# Patient Record
Sex: FEMALE | Race: BLACK OR AFRICAN AMERICAN | Employment: OTHER | URBAN - METROPOLITAN AREA
[De-identification: names, ages, dates, MRNs, and addresses within clinical notes are randomized per-mention and may not be internally consistent; named-entity substitution may affect disease eponyms.]

---

## 2017-01-01 ENCOUNTER — APPOINTMENT (OUTPATIENT)
Dept: GENERAL RADIOLOGY | Age: 82
DRG: 208 | End: 2017-01-01
Attending: INTERNAL MEDICINE
Payer: MEDICARE

## 2017-01-01 ENCOUNTER — APPOINTMENT (OUTPATIENT)
Dept: GENERAL RADIOLOGY | Age: 82
End: 2017-01-01
Attending: EMERGENCY MEDICINE
Payer: MEDICARE

## 2017-01-01 ENCOUNTER — APPOINTMENT (OUTPATIENT)
Dept: CT IMAGING | Age: 82
DRG: 208 | End: 2017-01-01
Attending: EMERGENCY MEDICINE
Payer: MEDICARE

## 2017-01-01 ENCOUNTER — HOSPITAL ENCOUNTER (INPATIENT)
Age: 82
LOS: 3 days | DRG: 208 | End: 2017-02-18
Attending: EMERGENCY MEDICINE | Admitting: HOSPITALIST
Payer: MEDICARE

## 2017-01-01 ENCOUNTER — HOSPITAL ENCOUNTER (EMERGENCY)
Age: 82
Discharge: HOME | End: 2017-02-03
Attending: EMERGENCY MEDICINE
Payer: MEDICARE

## 2017-01-01 ENCOUNTER — APPOINTMENT (OUTPATIENT)
Dept: GENERAL RADIOLOGY | Age: 82
DRG: 208 | End: 2017-01-01
Attending: EMERGENCY MEDICINE
Payer: MEDICARE

## 2017-01-01 ENCOUNTER — HOSPITAL ENCOUNTER (EMERGENCY)
Age: 82
Discharge: HOME | DRG: 208 | End: 2017-02-15
Attending: EMERGENCY MEDICINE
Payer: MEDICARE

## 2017-01-01 ENCOUNTER — APPOINTMENT (OUTPATIENT)
Dept: GENERAL RADIOLOGY | Age: 82
DRG: 208 | End: 2017-01-01
Attending: HOSPITALIST
Payer: MEDICARE

## 2017-01-01 VITALS
RESPIRATION RATE: 16 BRPM | DIASTOLIC BLOOD PRESSURE: 57 MMHG | SYSTOLIC BLOOD PRESSURE: 142 MMHG | OXYGEN SATURATION: 97 % | BODY MASS INDEX: 39.53 KG/M2 | WEIGHT: 223.11 LBS | HEART RATE: 81 BPM | TEMPERATURE: 97.9 F | HEIGHT: 63 IN

## 2017-01-01 VITALS
SYSTOLIC BLOOD PRESSURE: 177 MMHG | RESPIRATION RATE: 19 BRPM | OXYGEN SATURATION: 96 % | DIASTOLIC BLOOD PRESSURE: 94 MMHG | BODY MASS INDEX: 37.62 KG/M2 | WEIGHT: 212.3 LBS | HEART RATE: 71 BPM | TEMPERATURE: 98 F | HEIGHT: 63 IN

## 2017-01-01 VITALS
WEIGHT: 220.9 LBS | SYSTOLIC BLOOD PRESSURE: 61 MMHG | OXYGEN SATURATION: 76 % | RESPIRATION RATE: 27 BRPM | HEART RATE: 92 BPM | DIASTOLIC BLOOD PRESSURE: 35 MMHG | TEMPERATURE: 99.7 F | BODY MASS INDEX: 39.13 KG/M2

## 2017-01-01 DIAGNOSIS — Z71.89 COUNSELING REGARDING GOALS OF CARE: ICD-10-CM

## 2017-01-01 DIAGNOSIS — J44.9 CHRONIC OBSTRUCTIVE PULMONARY DISEASE, UNSPECIFIED COPD TYPE (HCC): Primary | ICD-10-CM

## 2017-01-01 DIAGNOSIS — G47.33 OSA (OBSTRUCTIVE SLEEP APNEA): ICD-10-CM

## 2017-01-01 DIAGNOSIS — Z71.89 DO NOT RESUSCITATE DISCUSSION: ICD-10-CM

## 2017-01-01 DIAGNOSIS — J96.00 ACUTE RESPIRATORY FAILURE, UNSPECIFIED WHETHER WITH HYPOXIA OR HYPERCAPNIA (HCC): ICD-10-CM

## 2017-01-01 DIAGNOSIS — J44.1 ACUTE EXACERBATION OF CHRONIC OBSTRUCTIVE PULMONARY DISEASE (COPD) (HCC): Primary | ICD-10-CM

## 2017-01-01 DIAGNOSIS — T85.698A MALFUNCTION OF DEVICE, INITIAL ENCOUNTER: ICD-10-CM

## 2017-01-01 DIAGNOSIS — E11.65 CONTROLLED TYPE 2 DIABETES MELLITUS WITH HYPERGLYCEMIA, WITH LONG-TERM CURRENT USE OF INSULIN (HCC): ICD-10-CM

## 2017-01-01 DIAGNOSIS — G93.1 ANOXIC BRAIN INJURY (HCC): ICD-10-CM

## 2017-01-01 DIAGNOSIS — J96.22 ACUTE ON CHRONIC RESPIRATORY FAILURE WITH HYPERCAPNIA (HCC): ICD-10-CM

## 2017-01-01 DIAGNOSIS — M16.12 ARTHRITIS OF LEFT HIP: ICD-10-CM

## 2017-01-01 DIAGNOSIS — M25.552 PAIN OF LEFT HIP JOINT: ICD-10-CM

## 2017-01-01 DIAGNOSIS — I46.9 CARDIAC ARREST (HCC): Primary | ICD-10-CM

## 2017-01-01 DIAGNOSIS — Z99.81 OXYGEN DEPENDENT: ICD-10-CM

## 2017-01-01 DIAGNOSIS — Z79.4 CONTROLLED TYPE 2 DIABETES MELLITUS WITH HYPERGLYCEMIA, WITH LONG-TERM CURRENT USE OF INSULIN (HCC): ICD-10-CM

## 2017-01-01 DIAGNOSIS — R06.02 SOB (SHORTNESS OF BREATH): ICD-10-CM

## 2017-01-01 LAB
ALBUMIN SERPL BCP-MCNC: 2.3 G/DL (ref 3.5–5)
ALBUMIN SERPL BCP-MCNC: 2.4 G/DL (ref 3.5–5)
ALBUMIN SERPL BCP-MCNC: 2.5 G/DL (ref 3.5–5)
ALBUMIN SERPL BCP-MCNC: 2.9 G/DL (ref 3.5–5)
ALBUMIN SERPL BCP-MCNC: 3 G/DL (ref 3.5–5)
ALBUMIN SERPL BCP-MCNC: 3.5 G/DL (ref 3.5–5)
ALBUMIN/GLOB SERPL: 0.6 {RATIO} (ref 1.1–2.2)
ALBUMIN/GLOB SERPL: 0.7 {RATIO} (ref 1.1–2.2)
ALBUMIN/GLOB SERPL: 0.7 {RATIO} (ref 1.1–2.2)
ALBUMIN/GLOB SERPL: 0.8 {RATIO} (ref 1.1–2.2)
ALP SERPL-CCNC: 105 U/L (ref 45–117)
ALP SERPL-CCNC: 109 U/L (ref 45–117)
ALP SERPL-CCNC: 121 U/L (ref 45–117)
ALP SERPL-CCNC: 72 U/L (ref 45–117)
ALP SERPL-CCNC: 79 U/L (ref 45–117)
ALP SERPL-CCNC: 87 U/L (ref 45–117)
ALT SERPL-CCNC: 100 U/L (ref 12–78)
ALT SERPL-CCNC: 20 U/L (ref 12–78)
ALT SERPL-CCNC: 27 U/L (ref 12–78)
ALT SERPL-CCNC: 44 U/L (ref 12–78)
ALT SERPL-CCNC: 50 U/L (ref 12–78)
ALT SERPL-CCNC: 73 U/L (ref 12–78)
AMMONIA PLAS-SCNC: 29 UMOL/L
ANION GAP BLD CALC-SCNC: 11 MMOL/L (ref 5–15)
ANION GAP BLD CALC-SCNC: 17 MMOL/L (ref 5–15)
ANION GAP BLD CALC-SCNC: 9 MMOL/L (ref 5–15)
APTT PPP: 26.8 SEC (ref 22.1–32.5)
ARTERIAL PATENCY WRIST A: YES
AST SERPL W P-5'-P-CCNC: 134 U/L (ref 15–37)
AST SERPL W P-5'-P-CCNC: 144 U/L (ref 15–37)
AST SERPL W P-5'-P-CCNC: 21 U/L (ref 15–37)
AST SERPL W P-5'-P-CCNC: 23 U/L (ref 15–37)
AST SERPL W P-5'-P-CCNC: 69 U/L (ref 15–37)
AST SERPL W P-5'-P-CCNC: 80 U/L (ref 15–37)
ATRIAL RATE: 109 BPM
ATRIAL RATE: 70 BPM
ATRIAL RATE: 73 BPM
BACTERIA SPEC CULT: NORMAL
BASE DEFICIT BLDA-SCNC: 5.8 MMOL/L
BASOPHILS # BLD AUTO: 0 K/UL
BASOPHILS # BLD AUTO: 0 K/UL (ref 0–0.1)
BASOPHILS # BLD: 0 %
BASOPHILS # BLD: 0 % (ref 0–1)
BASOPHILS # BLD: 1 % (ref 0–1)
BDY SITE: ABNORMAL
BILIRUB SERPL-MCNC: 0.2 MG/DL (ref 0.2–1)
BILIRUB SERPL-MCNC: 0.2 MG/DL (ref 0.2–1)
BILIRUB SERPL-MCNC: 0.3 MG/DL (ref 0.2–1)
BILIRUB SERPL-MCNC: 0.6 MG/DL (ref 0.2–1)
BILIRUB SERPL-MCNC: 0.6 MG/DL (ref 0.2–1)
BILIRUB SERPL-MCNC: 0.7 MG/DL (ref 0.2–1)
BNP SERPL-MCNC: 357 PG/ML (ref 0–450)
BUN SERPL-MCNC: 16 MG/DL (ref 6–20)
BUN SERPL-MCNC: 19 MG/DL (ref 6–20)
BUN SERPL-MCNC: 20 MG/DL (ref 6–20)
BUN SERPL-MCNC: 20 MG/DL (ref 6–20)
BUN SERPL-MCNC: 25 MG/DL (ref 6–20)
BUN SERPL-MCNC: 26 MG/DL (ref 6–20)
BUN/CREAT SERPL: 18 (ref 12–20)
BUN/CREAT SERPL: 20 (ref 12–20)
BUN/CREAT SERPL: 22 (ref 12–20)
BUN/CREAT SERPL: 28 (ref 12–20)
BUN/CREAT SERPL: 30 (ref 12–20)
BUN/CREAT SERPL: 30 (ref 12–20)
CALCIUM SERPL-MCNC: 7.7 MG/DL (ref 8.5–10.1)
CALCIUM SERPL-MCNC: 7.8 MG/DL (ref 8.5–10.1)
CALCIUM SERPL-MCNC: 7.8 MG/DL (ref 8.5–10.1)
CALCIUM SERPL-MCNC: 7.9 MG/DL (ref 8.5–10.1)
CALCIUM SERPL-MCNC: 8.4 MG/DL (ref 8.5–10.1)
CALCIUM SERPL-MCNC: 8.9 MG/DL (ref 8.5–10.1)
CALCULATED P AXIS, ECG09: 44 DEGREES
CALCULATED P AXIS, ECG09: 48 DEGREES
CALCULATED P AXIS, ECG09: 49 DEGREES
CALCULATED R AXIS, ECG10: -26 DEGREES
CALCULATED R AXIS, ECG10: -31 DEGREES
CALCULATED R AXIS, ECG10: -51 DEGREES
CALCULATED T AXIS, ECG11: 24 DEGREES
CALCULATED T AXIS, ECG11: 37 DEGREES
CALCULATED T AXIS, ECG11: 6 DEGREES
CHLORIDE SERPL-SCNC: 100 MMOL/L (ref 97–108)
CHLORIDE SERPL-SCNC: 104 MMOL/L (ref 97–108)
CHLORIDE SERPL-SCNC: 107 MMOL/L (ref 97–108)
CHLORIDE SERPL-SCNC: 109 MMOL/L (ref 97–108)
CHLORIDE SERPL-SCNC: 110 MMOL/L (ref 97–108)
CHLORIDE SERPL-SCNC: 99 MMOL/L (ref 97–108)
CK MB CFR SERPL CALC: 1.2 % (ref 0–2.5)
CK MB CFR SERPL CALC: 1.7 % (ref 0–2.5)
CK MB CFR SERPL CALC: 1.8 % (ref 0–2.5)
CK MB CFR SERPL CALC: NORMAL % (ref 0–2.5)
CK MB SERPL-MCNC: 2 NG/ML (ref 5–25)
CK MB SERPL-MCNC: 3.8 NG/ML (ref 5–25)
CK MB SERPL-MCNC: 4 NG/ML (ref 5–25)
CK MB SERPL-MCNC: <1 NG/ML (ref 5–25)
CK SERPL-CCNC: 114 U/L (ref 26–192)
CK SERPL-CCNC: 131 U/L (ref 26–192)
CK SERPL-CCNC: 172 U/L (ref 26–192)
CK SERPL-CCNC: 219 U/L (ref 26–192)
CK SERPL-CCNC: 219 U/L (ref 26–192)
CO2 SERPL-SCNC: 22 MMOL/L (ref 21–32)
CO2 SERPL-SCNC: 25 MMOL/L (ref 21–32)
CO2 SERPL-SCNC: 26 MMOL/L (ref 21–32)
CO2 SERPL-SCNC: 29 MMOL/L (ref 21–32)
CO2 SERPL-SCNC: 32 MMOL/L (ref 21–32)
CO2 SERPL-SCNC: 33 MMOL/L (ref 21–32)
CREAT SERPL-MCNC: 0.63 MG/DL (ref 0.55–1.02)
CREAT SERPL-MCNC: 0.81 MG/DL (ref 0.55–1.02)
CREAT SERPL-MCNC: 0.88 MG/DL (ref 0.55–1.02)
CREAT SERPL-MCNC: 0.88 MG/DL (ref 0.55–1.02)
CREAT SERPL-MCNC: 0.9 MG/DL (ref 0.55–1.02)
CREAT SERPL-MCNC: 1.12 MG/DL (ref 0.55–1.02)
DIAGNOSIS, 93000: NORMAL
DIFFERENTIAL METHOD BLD: ABNORMAL
EOSINOPHIL # BLD: 0 K/UL
EOSINOPHIL # BLD: 0 K/UL (ref 0–0.4)
EOSINOPHIL # BLD: 0.1 K/UL (ref 0–0.4)
EOSINOPHIL # BLD: 0.2 K/UL (ref 0–0.4)
EOSINOPHIL NFR BLD: 0 %
EOSINOPHIL NFR BLD: 0 % (ref 0–7)
EOSINOPHIL NFR BLD: 1 % (ref 0–7)
EOSINOPHIL NFR BLD: 2 % (ref 0–7)
EPAP/CPAP/PEEP, PAPEEP: 5
ERYTHROCYTE [DISTWIDTH] IN BLOOD BY AUTOMATED COUNT: 16.2 % (ref 11.5–14.5)
ERYTHROCYTE [DISTWIDTH] IN BLOOD BY AUTOMATED COUNT: 16.3 % (ref 11.5–14.5)
ERYTHROCYTE [DISTWIDTH] IN BLOOD BY AUTOMATED COUNT: 16.5 % (ref 11.5–14.5)
ERYTHROCYTE [DISTWIDTH] IN BLOOD BY AUTOMATED COUNT: 16.7 % (ref 11.5–14.5)
FIO2 ON VENT: 100 %
GAS FLOW.O2 SETTING OXYMISER: 16 L/MIN
GLOBULIN SER CALC-MCNC: 3.4 G/DL (ref 2–4)
GLOBULIN SER CALC-MCNC: 3.4 G/DL (ref 2–4)
GLOBULIN SER CALC-MCNC: 4.1 G/DL (ref 2–4)
GLOBULIN SER CALC-MCNC: 4.5 G/DL (ref 2–4)
GLOBULIN SER CALC-MCNC: 4.5 G/DL (ref 2–4)
GLOBULIN SER CALC-MCNC: 4.7 G/DL (ref 2–4)
GLUCOSE BLD STRIP.AUTO-MCNC: 118 MG/DL (ref 65–100)
GLUCOSE BLD STRIP.AUTO-MCNC: 123 MG/DL (ref 65–100)
GLUCOSE BLD STRIP.AUTO-MCNC: 123 MG/DL (ref 65–100)
GLUCOSE BLD STRIP.AUTO-MCNC: 131 MG/DL (ref 65–100)
GLUCOSE BLD STRIP.AUTO-MCNC: 133 MG/DL (ref 65–100)
GLUCOSE BLD STRIP.AUTO-MCNC: 134 MG/DL (ref 65–100)
GLUCOSE BLD STRIP.AUTO-MCNC: 134 MG/DL (ref 65–100)
GLUCOSE BLD STRIP.AUTO-MCNC: 146 MG/DL (ref 65–100)
GLUCOSE BLD STRIP.AUTO-MCNC: 216 MG/DL (ref 65–100)
GLUCOSE BLD STRIP.AUTO-MCNC: 83 MG/DL (ref 65–100)
GLUCOSE BLD STRIP.AUTO-MCNC: 93 MG/DL (ref 65–100)
GLUCOSE BLD STRIP.AUTO-MCNC: 95 MG/DL (ref 65–100)
GLUCOSE SERPL-MCNC: 116 MG/DL (ref 65–100)
GLUCOSE SERPL-MCNC: 116 MG/DL (ref 65–100)
GLUCOSE SERPL-MCNC: 129 MG/DL (ref 65–100)
GLUCOSE SERPL-MCNC: 151 MG/DL (ref 65–100)
GLUCOSE SERPL-MCNC: 162 MG/DL (ref 65–100)
GLUCOSE SERPL-MCNC: 281 MG/DL (ref 65–100)
GRAM STN SPEC: NORMAL
HCO3 BLDA-SCNC: 23 MMOL/L (ref 22–26)
HCT VFR BLD AUTO: 28.4 % (ref 35–47)
HCT VFR BLD AUTO: 28.4 % (ref 35–47)
HCT VFR BLD AUTO: 32.9 % (ref 35–47)
HCT VFR BLD AUTO: 34.6 % (ref 35–47)
HCT VFR BLD AUTO: 35.1 % (ref 35–47)
HCT VFR BLD AUTO: 36 % (ref 35–47)
HGB BLD-MCNC: 10.5 G/DL (ref 11.5–16)
HGB BLD-MCNC: 10.7 G/DL (ref 11.5–16)
HGB BLD-MCNC: 11.1 G/DL (ref 11.5–16)
HGB BLD-MCNC: 11.2 G/DL (ref 11.5–16)
HGB BLD-MCNC: 9.2 G/DL (ref 11.5–16)
HGB BLD-MCNC: 9.3 G/DL (ref 11.5–16)
INR PPP: 1.2 (ref 0.9–1.1)
LACTATE SERPL-SCNC: 8.2 MMOL/L (ref 0.4–2)
LYMPHOCYTES # BLD AUTO: 10 % (ref 12–49)
LYMPHOCYTES # BLD AUTO: 14 % (ref 12–49)
LYMPHOCYTES # BLD AUTO: 19 %
LYMPHOCYTES # BLD AUTO: 27 % (ref 12–49)
LYMPHOCYTES # BLD AUTO: 7 % (ref 12–49)
LYMPHOCYTES # BLD AUTO: 9 % (ref 12–49)
LYMPHOCYTES # BLD: 1.2 K/UL (ref 0.8–3.5)
LYMPHOCYTES # BLD: 1.3 K/UL (ref 0.8–3.5)
LYMPHOCYTES # BLD: 1.3 K/UL (ref 0.8–3.5)
LYMPHOCYTES # BLD: 1.8 K/UL (ref 0.8–3.5)
LYMPHOCYTES # BLD: 2.3 K/UL (ref 0.8–3.5)
LYMPHOCYTES # BLD: 2.9 K/UL
MAGNESIUM SERPL-MCNC: 1.7 MG/DL (ref 1.6–2.4)
MAGNESIUM SERPL-MCNC: 1.9 MG/DL (ref 1.6–2.4)
MAGNESIUM SERPL-MCNC: 2 MG/DL (ref 1.6–2.4)
MAGNESIUM SERPL-MCNC: 2.1 MG/DL (ref 1.6–2.4)
MCH RBC QN AUTO: 26.8 PG (ref 26–34)
MCH RBC QN AUTO: 26.9 PG (ref 26–34)
MCH RBC QN AUTO: 26.9 PG (ref 26–34)
MCH RBC QN AUTO: 27.2 PG (ref 26–34)
MCH RBC QN AUTO: 27.3 PG (ref 26–34)
MCH RBC QN AUTO: 27.4 PG (ref 26–34)
MCHC RBC AUTO-ENTMCNC: 30.8 G/DL (ref 30–36.5)
MCHC RBC AUTO-ENTMCNC: 30.9 G/DL (ref 30–36.5)
MCHC RBC AUTO-ENTMCNC: 31.9 G/DL (ref 30–36.5)
MCHC RBC AUTO-ENTMCNC: 31.9 G/DL (ref 30–36.5)
MCHC RBC AUTO-ENTMCNC: 32.4 G/DL (ref 30–36.5)
MCHC RBC AUTO-ENTMCNC: 32.7 G/DL (ref 30–36.5)
MCV RBC AUTO: 83 FL (ref 80–99)
MCV RBC AUTO: 83.5 FL (ref 80–99)
MCV RBC AUTO: 84.4 FL (ref 80–99)
MCV RBC AUTO: 85.6 FL (ref 80–99)
MCV RBC AUTO: 86.7 FL (ref 80–99)
MCV RBC AUTO: 88.2 FL (ref 80–99)
METAMYELOCYTES NFR BLD MANUAL: 4 %
MONOCYTES # BLD: 0 K/UL
MONOCYTES # BLD: 0.8 K/UL (ref 0–1)
MONOCYTES # BLD: 0.8 K/UL (ref 0–1)
MONOCYTES # BLD: 1.1 K/UL (ref 0–1)
MONOCYTES # BLD: 1.2 K/UL (ref 0–1)
MONOCYTES # BLD: 1.5 K/UL (ref 0–1)
MONOCYTES NFR BLD AUTO: 0 %
MONOCYTES NFR BLD AUTO: 10 % (ref 5–13)
MONOCYTES NFR BLD AUTO: 6 % (ref 5–13)
MONOCYTES NFR BLD AUTO: 7 % (ref 5–13)
MONOCYTES NFR BLD AUTO: 9 % (ref 5–13)
MONOCYTES NFR BLD AUTO: 9 % (ref 5–13)
NEUTS BAND NFR BLD MANUAL: 2 %
NEUTS SEG # BLD: 10.5 K/UL (ref 1.8–8)
NEUTS SEG # BLD: 11.1 K/UL (ref 1.8–8)
NEUTS SEG # BLD: 11.7 K/UL
NEUTS SEG # BLD: 12.4 K/UL (ref 1.8–8)
NEUTS SEG # BLD: 14.3 K/UL (ref 1.8–8)
NEUTS SEG # BLD: 5.1 K/UL (ref 1.8–8)
NEUTS SEG NFR BLD AUTO: 61 % (ref 32–75)
NEUTS SEG NFR BLD AUTO: 75 %
NEUTS SEG NFR BLD AUTO: 77 % (ref 32–75)
NEUTS SEG NFR BLD AUTO: 81 % (ref 32–75)
NEUTS SEG NFR BLD AUTO: 84 % (ref 32–75)
NEUTS SEG NFR BLD AUTO: 85 % (ref 32–75)
P-R INTERVAL, ECG05: 160 MS
P-R INTERVAL, ECG05: 166 MS
P-R INTERVAL, ECG05: 192 MS
PCO2 BLDA: 59 MMHG (ref 35–45)
PH BLDA: 7.21 [PH] (ref 7.35–7.45)
PHOSPHATE SERPL-MCNC: 2.2 MG/DL (ref 2.6–4.7)
PHOSPHATE SERPL-MCNC: 2.4 MG/DL (ref 2.6–4.7)
PHOSPHATE SERPL-MCNC: 4.2 MG/DL (ref 2.6–4.7)
PLATELET # BLD AUTO: 189 K/UL (ref 150–400)
PLATELET # BLD AUTO: 208 K/UL (ref 150–400)
PLATELET # BLD AUTO: 211 K/UL (ref 150–400)
PLATELET # BLD AUTO: 250 K/UL (ref 150–400)
PLATELET # BLD AUTO: 257 K/UL (ref 150–400)
PLATELET # BLD AUTO: 262 K/UL (ref 150–400)
PO2 BLDA: 235 MMHG (ref 80–100)
POTASSIUM SERPL-SCNC: 2.7 MMOL/L (ref 3.5–5.1)
POTASSIUM SERPL-SCNC: 3.1 MMOL/L (ref 3.5–5.1)
POTASSIUM SERPL-SCNC: 3.3 MMOL/L (ref 3.5–5.1)
POTASSIUM SERPL-SCNC: 3.7 MMOL/L (ref 3.5–5.1)
POTASSIUM SERPL-SCNC: 3.9 MMOL/L (ref 3.5–5.1)
POTASSIUM SERPL-SCNC: 4.4 MMOL/L (ref 3.5–5.1)
PROT SERPL-MCNC: 5.7 G/DL (ref 6.4–8.2)
PROT SERPL-MCNC: 5.9 G/DL (ref 6.4–8.2)
PROT SERPL-MCNC: 6.5 G/DL (ref 6.4–8.2)
PROT SERPL-MCNC: 7.4 G/DL (ref 6.4–8.2)
PROT SERPL-MCNC: 7.7 G/DL (ref 6.4–8.2)
PROT SERPL-MCNC: 8 G/DL (ref 6.4–8.2)
PROTHROMBIN TIME: 12 SEC (ref 9–11.1)
Q-T INTERVAL, ECG07: 340 MS
Q-T INTERVAL, ECG07: 398 MS
Q-T INTERVAL, ECG07: 412 MS
QRS DURATION, ECG06: 100 MS
QRS DURATION, ECG06: 106 MS
QRS DURATION, ECG06: 96 MS
QTC CALCULATION (BEZET), ECG08: 438 MS
QTC CALCULATION (BEZET), ECG08: 444 MS
QTC CALCULATION (BEZET), ECG08: 457 MS
RBC # BLD AUTO: 3.4 M/UL (ref 3.8–5.2)
RBC # BLD AUTO: 3.42 M/UL (ref 3.8–5.2)
RBC # BLD AUTO: 3.9 M/UL (ref 3.8–5.2)
RBC # BLD AUTO: 3.99 M/UL (ref 3.8–5.2)
RBC # BLD AUTO: 4.08 M/UL (ref 3.8–5.2)
RBC # BLD AUTO: 4.1 M/UL (ref 3.8–5.2)
RBC MORPH BLD: ABNORMAL
SAO2 % BLD: 99 % (ref 92–97)
SAO2% DEVICE SAO2% SENSOR NAME: ABNORMAL
SERVICE CMNT-IMP: ABNORMAL
SERVICE CMNT-IMP: NORMAL
SODIUM SERPL-SCNC: 141 MMOL/L (ref 136–145)
SODIUM SERPL-SCNC: 141 MMOL/L (ref 136–145)
SODIUM SERPL-SCNC: 143 MMOL/L (ref 136–145)
SODIUM SERPL-SCNC: 144 MMOL/L (ref 136–145)
SODIUM SERPL-SCNC: 145 MMOL/L (ref 136–145)
SODIUM SERPL-SCNC: 146 MMOL/L (ref 136–145)
SPECIMEN SITE: ABNORMAL
THERAPEUTIC RANGE,PTTT: NORMAL SECS (ref 58–77)
TRIGL SERPL-MCNC: 144 MG/DL (ref ?–150)
TROPONIN I SERPL-MCNC: 0.04 NG/ML
TROPONIN I SERPL-MCNC: 0.11 NG/ML
TROPONIN I SERPL-MCNC: 0.4 NG/ML
TROPONIN I SERPL-MCNC: <0.04 NG/ML
TROPONIN I SERPL-MCNC: <0.04 NG/ML
VENTILATION MODE VENT: ABNORMAL
VENTRICULAR RATE, ECG03: 109 BPM
VENTRICULAR RATE, ECG03: 70 BPM
VENTRICULAR RATE, ECG03: 73 BPM
VT SETTING VENT: 500 ML
WBC # BLD AUTO: 13.1 K/UL (ref 3.6–11)
WBC # BLD AUTO: 13.5 K/UL (ref 3.6–11)
WBC # BLD AUTO: 15.2 K/UL (ref 3.6–11)
WBC # BLD AUTO: 15.2 K/UL (ref 3.6–11)
WBC # BLD AUTO: 16.8 K/UL (ref 3.6–11)
WBC # BLD AUTO: 8.4 K/UL (ref 3.6–11)

## 2017-01-01 PROCEDURE — 74011000258 HC RX REV CODE- 258: Performed by: PSYCHIATRY & NEUROLOGY

## 2017-01-01 PROCEDURE — 82553 CREATINE MB FRACTION: CPT | Performed by: INTERNAL MEDICINE

## 2017-01-01 PROCEDURE — 74011000250 HC RX REV CODE- 250: Performed by: INTERNAL MEDICINE

## 2017-01-01 PROCEDURE — 85610 PROTHROMBIN TIME: CPT | Performed by: HOSPITALIST

## 2017-01-01 PROCEDURE — 74011000258 HC RX REV CODE- 258: Performed by: INTERNAL MEDICINE

## 2017-01-01 PROCEDURE — 85025 COMPLETE CBC W/AUTO DIFF WBC: CPT | Performed by: EMERGENCY MEDICINE

## 2017-01-01 PROCEDURE — 82140 ASSAY OF AMMONIA: CPT | Performed by: INTERNAL MEDICINE

## 2017-01-01 PROCEDURE — 83735 ASSAY OF MAGNESIUM: CPT | Performed by: INTERNAL MEDICINE

## 2017-01-01 PROCEDURE — 83735 ASSAY OF MAGNESIUM: CPT | Performed by: HOSPITALIST

## 2017-01-01 PROCEDURE — 74011250636 HC RX REV CODE- 250/636: Performed by: EMERGENCY MEDICINE

## 2017-01-01 PROCEDURE — 85025 COMPLETE CBC W/AUTO DIFF WBC: CPT | Performed by: INTERNAL MEDICINE

## 2017-01-01 PROCEDURE — 84484 ASSAY OF TROPONIN QUANT: CPT | Performed by: INTERNAL MEDICINE

## 2017-01-01 PROCEDURE — 36569 INSJ PICC 5 YR+ W/O IMAGING: CPT | Performed by: INTERNAL MEDICINE

## 2017-01-01 PROCEDURE — 82962 GLUCOSE BLOOD TEST: CPT

## 2017-01-01 PROCEDURE — 87070 CULTURE OTHR SPECIMN AEROBIC: CPT | Performed by: HOSPITALIST

## 2017-01-01 PROCEDURE — 94640 AIRWAY INHALATION TREATMENT: CPT

## 2017-01-01 PROCEDURE — 74011250637 HC RX REV CODE- 250/637: Performed by: HOSPITALIST

## 2017-01-01 PROCEDURE — 74011250636 HC RX REV CODE- 250/636: Performed by: INTERNAL MEDICINE

## 2017-01-01 PROCEDURE — 87040 BLOOD CULTURE FOR BACTERIA: CPT | Performed by: EMERGENCY MEDICINE

## 2017-01-01 PROCEDURE — 74011250636 HC RX REV CODE- 250/636: Performed by: PSYCHIATRY & NEUROLOGY

## 2017-01-01 PROCEDURE — 80053 COMPREHEN METABOLIC PANEL: CPT | Performed by: HOSPITALIST

## 2017-01-01 PROCEDURE — 74011000250 HC RX REV CODE- 250: Performed by: HOSPITALIST

## 2017-01-01 PROCEDURE — 02HV33Z INSERTION OF INFUSION DEVICE INTO SUPERIOR VENA CAVA, PERCUTANEOUS APPROACH: ICD-10-PCS | Performed by: INTERNAL MEDICINE

## 2017-01-01 PROCEDURE — 85730 THROMBOPLASTIN TIME PARTIAL: CPT | Performed by: HOSPITALIST

## 2017-01-01 PROCEDURE — 95816 EEG AWAKE AND DROWSY: CPT | Performed by: PSYCHIATRY & NEUROLOGY

## 2017-01-01 PROCEDURE — 77030018786 HC NDL GD F/USND BARD -B

## 2017-01-01 PROCEDURE — 0BH17EZ INSERTION OF ENDOTRACHEAL AIRWAY INTO TRACHEA, VIA NATURAL OR ARTIFICIAL OPENING: ICD-10-PCS | Performed by: EMERGENCY MEDICINE

## 2017-01-01 PROCEDURE — C9113 INJ PANTOPRAZOLE SODIUM, VIA: HCPCS | Performed by: HOSPITALIST

## 2017-01-01 PROCEDURE — 73502 X-RAY EXAM HIP UNI 2-3 VIEWS: CPT

## 2017-01-01 PROCEDURE — 82550 ASSAY OF CK (CPK): CPT | Performed by: INTERNAL MEDICINE

## 2017-01-01 PROCEDURE — 93005 ELECTROCARDIOGRAM TRACING: CPT

## 2017-01-01 PROCEDURE — 84484 ASSAY OF TROPONIN QUANT: CPT | Performed by: EMERGENCY MEDICINE

## 2017-01-01 PROCEDURE — 94002 VENT MGMT INPAT INIT DAY: CPT

## 2017-01-01 PROCEDURE — 36415 COLL VENOUS BLD VENIPUNCTURE: CPT | Performed by: INTERNAL MEDICINE

## 2017-01-01 PROCEDURE — 80053 COMPREHEN METABOLIC PANEL: CPT | Performed by: INTERNAL MEDICINE

## 2017-01-01 PROCEDURE — 77030029684 HC NEB SM VOL KT MONA -A

## 2017-01-01 PROCEDURE — 65620000000 HC RM CCU GENERAL

## 2017-01-01 PROCEDURE — 84100 ASSAY OF PHOSPHORUS: CPT | Performed by: HOSPITALIST

## 2017-01-01 PROCEDURE — 84100 ASSAY OF PHOSPHORUS: CPT | Performed by: INTERNAL MEDICINE

## 2017-01-01 PROCEDURE — 36415 COLL VENOUS BLD VENIPUNCTURE: CPT | Performed by: EMERGENCY MEDICINE

## 2017-01-01 PROCEDURE — 70450 CT HEAD/BRAIN W/O DYE: CPT

## 2017-01-01 PROCEDURE — 76937 US GUIDE VASCULAR ACCESS: CPT

## 2017-01-01 PROCEDURE — 36600 WITHDRAWAL OF ARTERIAL BLOOD: CPT | Performed by: EMERGENCY MEDICINE

## 2017-01-01 PROCEDURE — 84484 ASSAY OF TROPONIN QUANT: CPT | Performed by: HOSPITALIST

## 2017-01-01 PROCEDURE — 82803 BLOOD GASES ANY COMBINATION: CPT | Performed by: EMERGENCY MEDICINE

## 2017-01-01 PROCEDURE — 77030032490 HC SLV COMPR SCD KNE COVD -B

## 2017-01-01 PROCEDURE — 80053 COMPREHEN METABOLIC PANEL: CPT | Performed by: EMERGENCY MEDICINE

## 2017-01-01 PROCEDURE — 85025 COMPLETE CBC W/AUTO DIFF WBC: CPT | Performed by: HOSPITALIST

## 2017-01-01 PROCEDURE — 74011636637 HC RX REV CODE- 636/637: Performed by: HOSPITALIST

## 2017-01-01 PROCEDURE — 74011000250 HC RX REV CODE- 250: Performed by: EMERGENCY MEDICINE

## 2017-01-01 PROCEDURE — 77010033678 HC OXYGEN DAILY

## 2017-01-01 PROCEDURE — 71010 XR CHEST PORT: CPT

## 2017-01-01 PROCEDURE — 82550 ASSAY OF CK (CPK): CPT | Performed by: EMERGENCY MEDICINE

## 2017-01-01 PROCEDURE — C1751 CATH, INF, PER/CENT/MIDLINE: HCPCS

## 2017-01-01 PROCEDURE — 84478 ASSAY OF TRIGLYCERIDES: CPT | Performed by: INTERNAL MEDICINE

## 2017-01-01 PROCEDURE — 83880 ASSAY OF NATRIURETIC PEPTIDE: CPT | Performed by: EMERGENCY MEDICINE

## 2017-01-01 PROCEDURE — 99285 EMERGENCY DEPT VISIT HI MDM: CPT

## 2017-01-01 PROCEDURE — 5A1945Z RESPIRATORY VENTILATION, 24-96 CONSECUTIVE HOURS: ICD-10-PCS | Performed by: EMERGENCY MEDICINE

## 2017-01-01 PROCEDURE — 74011250636 HC RX REV CODE- 250/636: Performed by: HOSPITALIST

## 2017-01-01 PROCEDURE — 94003 VENT MGMT INPAT SUBQ DAY: CPT

## 2017-01-01 PROCEDURE — 82550 ASSAY OF CK (CPK): CPT | Performed by: HOSPITALIST

## 2017-01-01 PROCEDURE — 77030020847 HC STATLOK BARD -A

## 2017-01-01 PROCEDURE — 77030018798 HC PMP KT ENTRL FED COVD -A

## 2017-01-01 PROCEDURE — 51798 US URINE CAPACITY MEASURE: CPT

## 2017-01-01 PROCEDURE — 76450000000

## 2017-01-01 PROCEDURE — 36415 COLL VENOUS BLD VENIPUNCTURE: CPT | Performed by: HOSPITALIST

## 2017-01-01 RX ORDER — ENALAPRILAT 1.25 MG/ML
1.25 INJECTION INTRAVENOUS EVERY 6 HOURS
Status: DISCONTINUED | OUTPATIENT
Start: 2017-01-01 | End: 2017-01-01

## 2017-01-01 RX ORDER — SODIUM CHLORIDE 9 MG/ML
500 INJECTION, SOLUTION INTRAVENOUS ONCE
Status: COMPLETED | OUTPATIENT
Start: 2017-01-01 | End: 2017-01-01

## 2017-01-01 RX ORDER — LATANOPROST 50 UG/ML
1 SOLUTION/ DROPS OPHTHALMIC
Status: DISCONTINUED | OUTPATIENT
Start: 2017-01-01 | End: 2017-02-19 | Stop reason: HOSPADM

## 2017-01-01 RX ORDER — AZITHROMYCIN 250 MG/1
250 TABLET, FILM COATED ORAL SEE ADMIN INSTRUCTIONS
Status: ON HOLD | COMMUNITY
End: 2017-01-01 | Stop reason: ALTCHOICE

## 2017-01-01 RX ORDER — ONDANSETRON 2 MG/ML
4 INJECTION INTRAMUSCULAR; INTRAVENOUS
Status: DISCONTINUED | OUTPATIENT
Start: 2017-01-01 | End: 2017-02-19 | Stop reason: HOSPADM

## 2017-01-01 RX ORDER — IPRATROPIUM BROMIDE AND ALBUTEROL SULFATE 2.5; .5 MG/3ML; MG/3ML
3 SOLUTION RESPIRATORY (INHALATION) ONCE
Status: COMPLETED | OUTPATIENT
Start: 2017-01-01 | End: 2017-01-01

## 2017-01-01 RX ORDER — SODIUM CHLORIDE 0.9 % (FLUSH) 0.9 %
10-30 SYRINGE (ML) INJECTION AS NEEDED
Status: DISCONTINUED | OUTPATIENT
Start: 2017-01-01 | End: 2017-01-01

## 2017-01-01 RX ORDER — SODIUM CHLORIDE 9 MG/ML
50 INJECTION, SOLUTION INTRAVENOUS CONTINUOUS
Status: DISCONTINUED | OUTPATIENT
Start: 2017-01-01 | End: 2017-01-01

## 2017-01-01 RX ORDER — SODIUM CHLORIDE 0.9 % (FLUSH) 0.9 %
20 SYRINGE (ML) INJECTION EVERY 24 HOURS
Status: DISCONTINUED | OUTPATIENT
Start: 2017-01-01 | End: 2017-01-01

## 2017-01-01 RX ORDER — SODIUM CHLORIDE 0.9 % (FLUSH) 0.9 %
10-40 SYRINGE (ML) INJECTION EVERY 8 HOURS
Status: DISCONTINUED | OUTPATIENT
Start: 2017-01-01 | End: 2017-01-01

## 2017-01-01 RX ORDER — GUAIFENESIN 100 MG/5ML
324 LIQUID (ML) ORAL
Status: COMPLETED | OUTPATIENT
Start: 2017-01-01 | End: 2017-01-01

## 2017-01-01 RX ORDER — BUMETANIDE 1 MG/1
1 TABLET ORAL 2 TIMES DAILY
COMMUNITY

## 2017-01-01 RX ORDER — ALBUTEROL SULFATE 5 MG/ML
2.5 SOLUTION RESPIRATORY (INHALATION) ONCE
COMMUNITY

## 2017-01-01 RX ORDER — INSULIN LISPRO 100 [IU]/ML
INJECTION, SOLUTION INTRAVENOUS; SUBCUTANEOUS EVERY 6 HOURS
Status: DISCONTINUED | OUTPATIENT
Start: 2017-01-01 | End: 2017-01-01

## 2017-01-01 RX ORDER — IPRATROPIUM BROMIDE AND ALBUTEROL SULFATE 2.5; .5 MG/3ML; MG/3ML
3 SOLUTION RESPIRATORY (INHALATION)
Status: ON HOLD | COMMUNITY
End: 2017-01-01 | Stop reason: ALTCHOICE

## 2017-01-01 RX ORDER — PROPOFOL 10 MG/ML
INJECTION, EMULSION INTRAVENOUS
Status: DISPENSED
Start: 2017-01-01 | End: 2017-01-01

## 2017-01-01 RX ORDER — PROPOFOL 10 MG/ML
5-50 VIAL (ML) INTRAVENOUS
Status: DISCONTINUED | OUTPATIENT
Start: 2017-01-01 | End: 2017-01-01

## 2017-01-01 RX ORDER — MAGNESIUM SULFATE HEPTAHYDRATE 40 MG/ML
2 INJECTION, SOLUTION INTRAVENOUS ONCE
Status: COMPLETED | OUTPATIENT
Start: 2017-01-01 | End: 2017-01-01

## 2017-01-01 RX ORDER — SCOLOPAMINE TRANSDERMAL SYSTEM 1 MG/1
1.5 PATCH, EXTENDED RELEASE TRANSDERMAL
Status: DISCONTINUED | OUTPATIENT
Start: 2017-01-01 | End: 2017-02-19 | Stop reason: HOSPADM

## 2017-01-01 RX ORDER — EPINEPHRINE 0.1 MG/ML
INJECTION INTRACARDIAC; INTRAVENOUS
Status: COMPLETED | OUTPATIENT
Start: 2017-01-01 | End: 2017-01-01

## 2017-01-01 RX ORDER — CHLORHEXIDINE GLUCONATE 1.2 MG/ML
15 RINSE ORAL EVERY 12 HOURS
Status: DISCONTINUED | OUTPATIENT
Start: 2017-01-01 | End: 2017-01-01

## 2017-01-01 RX ORDER — MONTELUKAST SODIUM 10 MG/1
10 TABLET ORAL DAILY
COMMUNITY

## 2017-01-01 RX ORDER — LORAZEPAM 2 MG/ML
2 INJECTION INTRAMUSCULAR AS NEEDED
Status: DISCONTINUED | OUTPATIENT
Start: 2017-01-01 | End: 2017-02-19 | Stop reason: HOSPADM

## 2017-01-01 RX ORDER — PREDNISONE 20 MG/1
60 TABLET ORAL DAILY
Qty: 12 TAB | Refills: 0 | Status: ON HOLD | OUTPATIENT
Start: 2017-01-01 | End: 2017-01-01

## 2017-01-01 RX ORDER — GLYCOPYRROLATE 0.2 MG/ML
0.2 INJECTION INTRAMUSCULAR; INTRAVENOUS
Status: DISCONTINUED | OUTPATIENT
Start: 2017-01-01 | End: 2017-02-19 | Stop reason: HOSPADM

## 2017-01-01 RX ORDER — PREDNISONE 20 MG/1
60 TABLET ORAL
Status: COMPLETED | OUTPATIENT
Start: 2017-01-01 | End: 2017-01-01

## 2017-01-01 RX ORDER — POTASSIUM CHLORIDE 20 MEQ/1
20 TABLET, EXTENDED RELEASE ORAL DAILY
COMMUNITY

## 2017-01-01 RX ORDER — ACETAZOLAMIDE 250 MG/1
125 TABLET ORAL DAILY
Status: DISCONTINUED | OUTPATIENT
Start: 2017-01-01 | End: 2017-01-01

## 2017-01-01 RX ORDER — FLUTICASONE FUROATE AND VILANTEROL 100; 25 UG/1; UG/1
1 POWDER RESPIRATORY (INHALATION) DAILY
Status: DISCONTINUED | OUTPATIENT
Start: 2017-01-01 | End: 2017-01-01

## 2017-01-01 RX ORDER — TIZANIDINE 2 MG/1
2 TABLET ORAL AS NEEDED
COMMUNITY

## 2017-01-01 RX ORDER — SODIUM CHLORIDE 0.9 % (FLUSH) 0.9 %
10 SYRINGE (ML) INJECTION EVERY 24 HOURS
Status: DISCONTINUED | OUTPATIENT
Start: 2017-01-01 | End: 2017-01-01

## 2017-01-01 RX ORDER — LORAZEPAM 2 MG/ML
2 INJECTION INTRAMUSCULAR
Status: COMPLETED | OUTPATIENT
Start: 2017-01-01 | End: 2017-01-01

## 2017-01-01 RX ORDER — INSULIN LISPRO 100 [IU]/ML
INJECTION, SOLUTION INTRAVENOUS; SUBCUTANEOUS
Status: DISCONTINUED | OUTPATIENT
Start: 2017-01-01 | End: 2017-01-01

## 2017-01-01 RX ORDER — IPRATROPIUM BROMIDE AND ALBUTEROL SULFATE 2.5; .5 MG/3ML; MG/3ML
3 SOLUTION RESPIRATORY (INHALATION)
Status: DISPENSED | OUTPATIENT
Start: 2017-01-01 | End: 2017-01-01

## 2017-01-01 RX ORDER — CEFEPIME HYDROCHLORIDE 1 G/1
1 INJECTION, POWDER, FOR SOLUTION INTRAMUSCULAR; INTRAVENOUS EVERY 12 HOURS
Status: DISCONTINUED | OUTPATIENT
Start: 2017-01-01 | End: 2017-01-01 | Stop reason: DRUGHIGH

## 2017-01-01 RX ORDER — METOPROLOL TARTRATE 5 MG/5ML
5 INJECTION INTRAVENOUS
Status: DISCONTINUED | OUTPATIENT
Start: 2017-01-01 | End: 2017-01-01

## 2017-01-01 RX ORDER — POTASSIUM CHLORIDE 7.45 MG/ML
10 INJECTION INTRAVENOUS
Status: COMPLETED | OUTPATIENT
Start: 2017-01-01 | End: 2017-01-01

## 2017-01-01 RX ORDER — HEPARIN 100 UNIT/ML
300 SYRINGE INTRAVENOUS AS NEEDED
Status: DISCONTINUED | OUTPATIENT
Start: 2017-01-01 | End: 2017-01-01

## 2017-01-01 RX ORDER — TRAMADOL HYDROCHLORIDE 50 MG/1
50 TABLET ORAL
COMMUNITY

## 2017-01-01 RX ORDER — LANOLIN ALCOHOL/MO/W.PET/CERES
325 CREAM (GRAM) TOPICAL
COMMUNITY

## 2017-01-01 RX ORDER — MORPHINE SULFATE 4 MG/ML
4-8 INJECTION, SOLUTION INTRAMUSCULAR; INTRAVENOUS
Status: DISCONTINUED | OUTPATIENT
Start: 2017-01-01 | End: 2017-01-01

## 2017-01-01 RX ORDER — DEXTROSE 50 % IN WATER (D50W) INTRAVENOUS SYRINGE
12.5-25 AS NEEDED
Status: DISCONTINUED | OUTPATIENT
Start: 2017-01-01 | End: 2017-02-19 | Stop reason: HOSPADM

## 2017-01-01 RX ORDER — FENTANYL CITRATE 50 UG/ML
25-50 INJECTION, SOLUTION INTRAMUSCULAR; INTRAVENOUS
Status: DISCONTINUED | OUTPATIENT
Start: 2017-01-01 | End: 2017-01-01

## 2017-01-01 RX ORDER — LORAZEPAM 2 MG/ML
1 INJECTION INTRAMUSCULAR
Status: DISCONTINUED | OUTPATIENT
Start: 2017-01-01 | End: 2017-02-19 | Stop reason: HOSPADM

## 2017-01-01 RX ORDER — MAGNESIUM SULFATE 100 %
4 CRYSTALS MISCELLANEOUS AS NEEDED
Status: DISCONTINUED | OUTPATIENT
Start: 2017-01-01 | End: 2017-01-01

## 2017-01-01 RX ORDER — IPRATROPIUM BROMIDE AND ALBUTEROL SULFATE 2.5; .5 MG/3ML; MG/3ML
3 SOLUTION RESPIRATORY (INHALATION)
Status: DISCONTINUED | OUTPATIENT
Start: 2017-01-01 | End: 2017-01-01

## 2017-01-01 RX ORDER — SODIUM CHLORIDE 0.9 % (FLUSH) 0.9 %
10 SYRINGE (ML) INJECTION AS NEEDED
Status: DISCONTINUED | OUTPATIENT
Start: 2017-01-01 | End: 2017-01-01

## 2017-01-01 RX ORDER — DEXTROSE MONOHYDRATE AND SODIUM CHLORIDE 5; .9 G/100ML; G/100ML
50 INJECTION, SOLUTION INTRAVENOUS CONTINUOUS
Status: DISCONTINUED | OUTPATIENT
Start: 2017-01-01 | End: 2017-01-01

## 2017-01-01 RX ORDER — ACETAMINOPHEN 325 MG/1
650 TABLET ORAL
Status: DISCONTINUED | OUTPATIENT
Start: 2017-01-01 | End: 2017-02-19 | Stop reason: HOSPADM

## 2017-01-01 RX ORDER — PREDNISONE 10 MG/1
10 TABLET ORAL
Status: ON HOLD | COMMUNITY
End: 2017-01-01 | Stop reason: ALTCHOICE

## 2017-01-01 RX ORDER — SODIUM CHLORIDE 0.9 % (FLUSH) 0.9 %
5-10 SYRINGE (ML) INJECTION AS NEEDED
Status: DISCONTINUED | OUTPATIENT
Start: 2017-01-01 | End: 2017-02-19 | Stop reason: HOSPADM

## 2017-01-01 RX ORDER — LEVOFLOXACIN 5 MG/ML
750 INJECTION, SOLUTION INTRAVENOUS
Status: DISCONTINUED | OUTPATIENT
Start: 2017-01-01 | End: 2017-01-01

## 2017-01-01 RX ORDER — MUPIROCIN 20 MG/G
OINTMENT TOPICAL EVERY 12 HOURS
Status: DISCONTINUED | OUTPATIENT
Start: 2017-01-01 | End: 2017-02-19 | Stop reason: HOSPADM

## 2017-01-01 RX ORDER — ATROPINE SULFATE 0.1 MG/ML
INJECTION INTRAVENOUS
Status: COMPLETED | OUTPATIENT
Start: 2017-01-01 | End: 2017-01-01

## 2017-01-01 RX ORDER — SODIUM CHLORIDE 0.9 % (FLUSH) 0.9 %
5-10 SYRINGE (ML) INJECTION EVERY 8 HOURS
Status: DISCONTINUED | OUTPATIENT
Start: 2017-01-01 | End: 2017-02-19 | Stop reason: HOSPADM

## 2017-01-01 RX ORDER — DEXAMETHASONE SODIUM PHOSPHATE 4 MG/ML
4 INJECTION, SOLUTION INTRA-ARTICULAR; INTRALESIONAL; INTRAMUSCULAR; INTRAVENOUS; SOFT TISSUE
Status: DISCONTINUED | OUTPATIENT
Start: 2017-01-01 | End: 2017-02-19 | Stop reason: HOSPADM

## 2017-01-01 RX ORDER — MORPHINE SULFATE 4 MG/ML
4-8 INJECTION, SOLUTION INTRAMUSCULAR; INTRAVENOUS
Status: DISCONTINUED | OUTPATIENT
Start: 2017-01-01 | End: 2017-02-19 | Stop reason: HOSPADM

## 2017-01-01 RX ORDER — POTASSIUM CHLORIDE 29.8 MG/ML
20 INJECTION INTRAVENOUS
Status: DISCONTINUED | OUTPATIENT
Start: 2017-01-01 | End: 2017-01-01

## 2017-01-01 RX ORDER — LATANOPROST 50 UG/ML
1 SOLUTION/ DROPS OPHTHALMIC
COMMUNITY

## 2017-01-01 RX ORDER — SUCCINYLCHOLINE CHLORIDE 20 MG/ML
INJECTION INTRAMUSCULAR; INTRAVENOUS
Status: DISPENSED
Start: 2017-01-01 | End: 2017-01-01

## 2017-01-01 RX ORDER — LEVOFLOXACIN 5 MG/ML
500 INJECTION, SOLUTION INTRAVENOUS EVERY 24 HOURS
Status: DISCONTINUED | OUTPATIENT
Start: 2017-01-01 | End: 2017-01-01

## 2017-01-01 RX ADMIN — FENTANYL CITRATE 50 MCG: 50 INJECTION, SOLUTION INTRAMUSCULAR; INTRAVENOUS at 14:24

## 2017-01-01 RX ADMIN — CHLORHEXIDINE GLUCONATE 15 ML: 1.2 RINSE ORAL at 22:44

## 2017-01-01 RX ADMIN — LORAZEPAM 1 MG: 2 INJECTION INTRAMUSCULAR; INTRAVENOUS at 10:28

## 2017-01-01 RX ADMIN — EPINEPHRINE 1 MG: 0.1 INJECTION, SOLUTION ENDOTRACHEAL; INTRACARDIAC; INTRAVENOUS at 06:41

## 2017-01-01 RX ADMIN — PROPOFOL 25 MCG/KG/MIN: 10 INJECTION, EMULSION INTRAVENOUS at 02:21

## 2017-01-01 RX ADMIN — MUPIROCIN: 20 OINTMENT TOPICAL at 22:06

## 2017-01-01 RX ADMIN — IPRATROPIUM BROMIDE AND ALBUTEROL SULFATE 3 ML: .5; 3 SOLUTION RESPIRATORY (INHALATION) at 21:10

## 2017-01-01 RX ADMIN — LORAZEPAM 2 MG: 2 INJECTION INTRAMUSCULAR; INTRAVENOUS at 02:51

## 2017-01-01 RX ADMIN — PROPOFOL 25 MCG/KG/MIN: 10 INJECTION, EMULSION INTRAVENOUS at 12:56

## 2017-01-01 RX ADMIN — CHLORHEXIDINE GLUCONATE 15 ML: 1.2 RINSE ORAL at 08:06

## 2017-01-01 RX ADMIN — Medication 10 ML: at 05:16

## 2017-01-01 RX ADMIN — LORAZEPAM 2 MG: 2 INJECTION INTRAMUSCULAR; INTRAVENOUS at 04:06

## 2017-01-01 RX ADMIN — SODIUM CHLORIDE 500 ML: 900 INJECTION, SOLUTION INTRAVENOUS at 22:28

## 2017-01-01 RX ADMIN — DEXTROSE MONOHYDRATE AND SODIUM CHLORIDE 50 ML/HR: 5; .9 INJECTION, SOLUTION INTRAVENOUS at 17:23

## 2017-01-01 RX ADMIN — LEVOFLOXACIN 500 MG: 5 INJECTION, SOLUTION INTRAVENOUS at 16:14

## 2017-01-01 RX ADMIN — LORAZEPAM 1 MG: 2 INJECTION INTRAMUSCULAR; INTRAVENOUS at 00:02

## 2017-01-01 RX ADMIN — PROPOFOL 25 MCG/KG/MIN: 10 INJECTION, EMULSION INTRAVENOUS at 11:04

## 2017-01-01 RX ADMIN — PROPOFOL 25 MCG/KG/MIN: 10 INJECTION, EMULSION INTRAVENOUS at 21:59

## 2017-01-01 RX ADMIN — METOPROLOL TARTRATE 5 MG: 5 INJECTION INTRAVENOUS at 08:16

## 2017-01-01 RX ADMIN — Medication 10 ML: at 14:44

## 2017-01-01 RX ADMIN — LORAZEPAM 1 MG: 2 INJECTION INTRAMUSCULAR; INTRAVENOUS at 07:43

## 2017-01-01 RX ADMIN — IPRATROPIUM BROMIDE AND ALBUTEROL SULFATE 3 ML: .5; 3 SOLUTION RESPIRATORY (INHALATION) at 02:33

## 2017-01-01 RX ADMIN — PROPOFOL 25 MCG/KG/MIN: 10 INJECTION, EMULSION INTRAVENOUS at 08:45

## 2017-01-01 RX ADMIN — LEVETIRACETAM 1000 MG: 100 INJECTION, SOLUTION, CONCENTRATE INTRAVENOUS at 05:02

## 2017-01-01 RX ADMIN — ENALAPRILAT 1.25 MG: 2.5 INJECTION INTRAVENOUS at 12:27

## 2017-01-01 RX ADMIN — SODIUM CHLORIDE 40 MG: 9 INJECTION INTRAMUSCULAR; INTRAVENOUS; SUBCUTANEOUS at 08:15

## 2017-01-01 RX ADMIN — Medication 10 ML: at 05:15

## 2017-01-01 RX ADMIN — LORAZEPAM 1 MG: 2 INJECTION INTRAMUSCULAR; INTRAVENOUS at 11:41

## 2017-01-01 RX ADMIN — IPRATROPIUM BROMIDE AND ALBUTEROL SULFATE 3 ML: .5; 3 SOLUTION RESPIRATORY (INHALATION) at 07:48

## 2017-01-01 RX ADMIN — EPINEPHRINE 1 MG: 0.1 INJECTION, SOLUTION ENDOTRACHEAL; INTRACARDIAC; INTRAVENOUS at 06:43

## 2017-01-01 RX ADMIN — POTASSIUM CHLORIDE 10 MEQ: 10 INJECTION, SOLUTION INTRAVENOUS at 05:55

## 2017-01-01 RX ADMIN — Medication 10 ML: at 16:06

## 2017-01-01 RX ADMIN — IPRATROPIUM BROMIDE AND ALBUTEROL SULFATE 3 ML: .5; 3 SOLUTION RESPIRATORY (INHALATION) at 13:45

## 2017-01-01 RX ADMIN — MAGNESIUM SULFATE HEPTAHYDRATE 2 G: 40 INJECTION, SOLUTION INTRAVENOUS at 23:26

## 2017-01-01 RX ADMIN — DEXTROSE MONOHYDRATE AND SODIUM CHLORIDE 50 ML/HR: 5; .9 INJECTION, SOLUTION INTRAVENOUS at 16:03

## 2017-01-01 RX ADMIN — MUPIROCIN: 20 OINTMENT TOPICAL at 08:05

## 2017-01-01 RX ADMIN — LEVOFLOXACIN 750 MG: 5 INJECTION, SOLUTION INTRAVENOUS at 10:14

## 2017-01-01 RX ADMIN — LORAZEPAM 2 MG: 2 INJECTION INTRAMUSCULAR; INTRAVENOUS at 19:59

## 2017-01-01 RX ADMIN — SODIUM CHLORIDE 100 ML/HR: 900 INJECTION, SOLUTION INTRAVENOUS at 10:10

## 2017-01-01 RX ADMIN — POTASSIUM CHLORIDE 10 MEQ: 10 INJECTION, SOLUTION INTRAVENOUS at 05:09

## 2017-01-01 RX ADMIN — PROPOFOL 25 MCG/KG/MIN: 10 INJECTION, EMULSION INTRAVENOUS at 23:40

## 2017-01-01 RX ADMIN — ENALAPRILAT 1.25 MG: 2.5 INJECTION INTRAVENOUS at 23:39

## 2017-01-01 RX ADMIN — IPRATROPIUM BROMIDE AND ALBUTEROL SULFATE 3 ML: .5; 3 SOLUTION RESPIRATORY (INHALATION) at 20:21

## 2017-01-01 RX ADMIN — FENTANYL CITRATE 50 MCG: 50 INJECTION, SOLUTION INTRAMUSCULAR; INTRAVENOUS at 02:34

## 2017-01-01 RX ADMIN — Medication 10 ML: at 22:00

## 2017-01-01 RX ADMIN — MORPHINE SULFATE 8 MG: 4 INJECTION, SOLUTION INTRAMUSCULAR; INTRAVENOUS at 08:05

## 2017-01-01 RX ADMIN — PROPOFOL 25 MCG/KG/MIN: 10 INJECTION, EMULSION INTRAVENOUS at 16:03

## 2017-01-01 RX ADMIN — LEVETIRACETAM 1000 MG: 100 INJECTION, SOLUTION, CONCENTRATE INTRAVENOUS at 17:23

## 2017-01-01 RX ADMIN — LORAZEPAM 2 MG: 2 INJECTION INTRAMUSCULAR; INTRAVENOUS at 17:58

## 2017-01-01 RX ADMIN — LORAZEPAM 2 MG: 2 INJECTION INTRAMUSCULAR; INTRAVENOUS at 08:42

## 2017-01-01 RX ADMIN — LATANOPROST 1 DROP: 50 SOLUTION OPHTHALMIC at 22:07

## 2017-01-01 RX ADMIN — MORPHINE SULFATE 8 MG: 4 INJECTION, SOLUTION INTRAMUSCULAR; INTRAVENOUS at 09:27

## 2017-01-01 RX ADMIN — IPRATROPIUM BROMIDE AND ALBUTEROL SULFATE 3 ML: .5; 3 SOLUTION RESPIRATORY (INHALATION) at 13:20

## 2017-01-01 RX ADMIN — LORAZEPAM 2 MG: 2 INJECTION INTRAMUSCULAR; INTRAVENOUS at 21:58

## 2017-01-01 RX ADMIN — MUPIROCIN: 20 OINTMENT TOPICAL at 08:06

## 2017-01-01 RX ADMIN — METOPROLOL TARTRATE 5 MG: 5 INJECTION INTRAVENOUS at 16:21

## 2017-01-01 RX ADMIN — Medication 20 ML: at 12:10

## 2017-01-01 RX ADMIN — ENALAPRILAT 1.25 MG: 2.5 INJECTION INTRAVENOUS at 17:13

## 2017-01-01 RX ADMIN — PREDNISONE 60 MG: 20 TABLET ORAL at 23:25

## 2017-01-01 RX ADMIN — FENTANYL CITRATE 50 MCG: 50 INJECTION, SOLUTION INTRAMUSCULAR; INTRAVENOUS at 22:12

## 2017-01-01 RX ADMIN — IPRATROPIUM BROMIDE AND ALBUTEROL SULFATE 3 ML: .5; 3 SOLUTION RESPIRATORY (INHALATION) at 02:25

## 2017-01-01 RX ADMIN — LORAZEPAM 2 MG: 2 INJECTION INTRAMUSCULAR; INTRAVENOUS at 06:06

## 2017-01-01 RX ADMIN — MORPHINE SULFATE 8 MG: 4 INJECTION, SOLUTION INTRAMUSCULAR; INTRAVENOUS at 10:28

## 2017-01-01 RX ADMIN — LEVETIRACETAM 1000 MG: 100 INJECTION, SOLUTION, CONCENTRATE INTRAVENOUS at 05:15

## 2017-01-01 RX ADMIN — IPRATROPIUM BROMIDE AND ALBUTEROL SULFATE 3 ML: .5; 3 SOLUTION RESPIRATORY (INHALATION) at 22:26

## 2017-01-01 RX ADMIN — SODIUM CHLORIDE 40 MG: 9 INJECTION INTRAMUSCULAR; INTRAVENOUS; SUBCUTANEOUS at 10:16

## 2017-01-01 RX ADMIN — MORPHINE SULFATE 8 MG: 4 INJECTION, SOLUTION INTRAMUSCULAR; INTRAVENOUS at 11:41

## 2017-01-01 RX ADMIN — IPRATROPIUM BROMIDE AND ALBUTEROL SULFATE 3 ML: .5; 3 SOLUTION RESPIRATORY (INHALATION) at 03:59

## 2017-01-01 RX ADMIN — LEVETIRACETAM 1000 MG: 100 INJECTION, SOLUTION, CONCENTRATE INTRAVENOUS at 17:04

## 2017-01-01 RX ADMIN — FENTANYL CITRATE 50 MCG: 50 INJECTION, SOLUTION INTRAMUSCULAR; INTRAVENOUS at 07:43

## 2017-01-01 RX ADMIN — MUPIROCIN: 20 OINTMENT TOPICAL at 20:03

## 2017-01-01 RX ADMIN — Medication 10 ML: at 23:40

## 2017-01-01 RX ADMIN — IPRATROPIUM BROMIDE AND ALBUTEROL SULFATE 3 ML: .5; 3 SOLUTION RESPIRATORY (INHALATION) at 20:42

## 2017-01-01 RX ADMIN — IPRATROPIUM BROMIDE AND ALBUTEROL SULFATE 3 ML: .5; 3 SOLUTION RESPIRATORY (INHALATION) at 08:31

## 2017-01-01 RX ADMIN — CEFEPIME HYDROCHLORIDE 2 G: 2 INJECTION, POWDER, FOR SOLUTION INTRAVENOUS at 20:02

## 2017-01-01 RX ADMIN — POTASSIUM CHLORIDE 10 MEQ: 10 INJECTION, SOLUTION INTRAVENOUS at 06:50

## 2017-01-01 RX ADMIN — ENALAPRILAT 1.25 MG: 2.5 INJECTION INTRAVENOUS at 00:59

## 2017-01-01 RX ADMIN — ENALAPRILAT 1.25 MG: 2.5 INJECTION INTRAVENOUS at 06:00

## 2017-01-01 RX ADMIN — METOPROLOL TARTRATE 5 MG: 5 INJECTION INTRAVENOUS at 16:17

## 2017-01-01 RX ADMIN — ENALAPRILAT 1.25 MG: 2.5 INJECTION INTRAVENOUS at 11:00

## 2017-01-01 RX ADMIN — FENTANYL CITRATE 50 MCG: 50 INJECTION, SOLUTION INTRAMUSCULAR; INTRAVENOUS at 19:59

## 2017-01-01 RX ADMIN — IPRATROPIUM BROMIDE AND ALBUTEROL SULFATE 3 ML: .5; 3 SOLUTION RESPIRATORY (INHALATION) at 11:02

## 2017-01-01 RX ADMIN — Medication 10 ML: at 05:03

## 2017-01-01 RX ADMIN — EPINEPHRINE 1 MG: 0.1 INJECTION, SOLUTION ENDOTRACHEAL; INTRACARDIAC; INTRAVENOUS at 06:46

## 2017-01-01 RX ADMIN — Medication 10 ML: at 22:02

## 2017-01-01 RX ADMIN — LEVETIRACETAM 1000 MG: 100 INJECTION, SOLUTION, CONCENTRATE INTRAVENOUS at 05:08

## 2017-01-01 RX ADMIN — CHLORHEXIDINE GLUCONATE 15 ML: 1.2 RINSE ORAL at 20:03

## 2017-01-01 RX ADMIN — PROPOFOL 25 MCG/KG/MIN: 10 INJECTION, EMULSION INTRAVENOUS at 18:41

## 2017-01-01 RX ADMIN — ENALAPRILAT 1.25 MG: 2.5 INJECTION INTRAVENOUS at 05:15

## 2017-01-01 RX ADMIN — CEFEPIME HYDROCHLORIDE 2 G: 2 INJECTION, POWDER, FOR SOLUTION INTRAVENOUS at 08:16

## 2017-01-01 RX ADMIN — Medication 30 ML: at 16:09

## 2017-01-01 RX ADMIN — Medication 10 ML: at 16:07

## 2017-01-01 RX ADMIN — ATROPINE SULFATE 1 MG: 0.1 INJECTION PARENTERAL at 06:42

## 2017-01-01 RX ADMIN — GLYCOPYRROLATE 0.2 MG: 0.2 INJECTION, SOLUTION INTRAMUSCULAR; INTRAVENOUS at 12:15

## 2017-01-01 RX ADMIN — LORAZEPAM 2 MG: 2 INJECTION INTRAMUSCULAR; INTRAVENOUS at 15:04

## 2017-01-01 RX ADMIN — GLYCOPYRROLATE 0.2 MG: 0.2 INJECTION, SOLUTION INTRAMUSCULAR; INTRAVENOUS at 07:42

## 2017-01-01 RX ADMIN — MUPIROCIN: 20 OINTMENT TOPICAL at 22:44

## 2017-01-01 RX ADMIN — LORAZEPAM 1 MG: 2 INJECTION INTRAMUSCULAR; INTRAVENOUS at 21:15

## 2017-01-01 RX ADMIN — MORPHINE SULFATE 8 MG: 4 INJECTION, SOLUTION INTRAMUSCULAR; INTRAVENOUS at 14:44

## 2017-01-01 RX ADMIN — CHLORHEXIDINE GLUCONATE 15 ML: 1.2 RINSE ORAL at 22:06

## 2017-01-01 RX ADMIN — GLYCOPYRROLATE 0.2 MG: 0.2 INJECTION, SOLUTION INTRAMUSCULAR; INTRAVENOUS at 21:10

## 2017-01-01 RX ADMIN — IPRATROPIUM BROMIDE AND ALBUTEROL SULFATE 3 ML: .5; 3 SOLUTION RESPIRATORY (INHALATION) at 14:56

## 2017-01-01 RX ADMIN — MUPIROCIN: 20 OINTMENT TOPICAL at 08:20

## 2017-01-01 RX ADMIN — POTASSIUM CHLORIDE 10 MEQ: 10 INJECTION, SOLUTION INTRAVENOUS at 07:52

## 2017-01-01 RX ADMIN — ENALAPRILAT 1.25 MG: 2.5 INJECTION INTRAVENOUS at 17:21

## 2017-01-01 RX ADMIN — MORPHINE SULFATE 8 MG: 4 INJECTION, SOLUTION INTRAMUSCULAR; INTRAVENOUS at 13:04

## 2017-01-01 RX ADMIN — LEVETIRACETAM 1000 MG: 100 INJECTION, SOLUTION, CONCENTRATE INTRAVENOUS at 16:03

## 2017-01-01 RX ADMIN — ASPIRIN 81 MG 324 MG: 81 TABLET ORAL at 10:20

## 2017-01-01 RX ADMIN — METOPROLOL TARTRATE 5 MG: 5 INJECTION INTRAVENOUS at 19:02

## 2017-01-01 RX ADMIN — INSULIN LISPRO 2 UNITS: 100 INJECTION, SOLUTION INTRAVENOUS; SUBCUTANEOUS at 11:15

## 2017-01-01 RX ADMIN — LATANOPROST 1 DROP: 50 SOLUTION OPHTHALMIC at 22:47

## 2017-01-01 RX ADMIN — CHLORHEXIDINE GLUCONATE 15 ML: 1.2 RINSE ORAL at 09:25

## 2017-01-01 RX ADMIN — LATANOPROST 1 DROP: 50 SOLUTION OPHTHALMIC at 22:01

## 2017-01-01 RX ADMIN — IPRATROPIUM BROMIDE AND ALBUTEROL SULFATE 3 ML: .5; 3 SOLUTION RESPIRATORY (INHALATION) at 20:01

## 2017-01-01 RX ADMIN — CHLORHEXIDINE GLUCONATE 15 ML: 1.2 RINSE ORAL at 10:18

## 2017-01-01 RX ADMIN — Medication 10 ML: at 12:10

## 2017-01-01 RX ADMIN — Medication 10 ML: at 22:07

## 2017-01-01 RX ADMIN — SODIUM CHLORIDE 40 MG: 9 INJECTION INTRAMUSCULAR; INTRAVENOUS; SUBCUTANEOUS at 08:06

## 2017-01-01 RX ADMIN — POTASSIUM PHOSPHATE, MONOBASIC AND POTASSIUM PHOSPHATE, DIBASIC 20 MMOL: 224; 236 INJECTION, SOLUTION INTRAVENOUS at 10:28

## 2017-01-01 RX ADMIN — MUPIROCIN: 20 OINTMENT TOPICAL at 10:18

## 2017-02-03 NOTE — ED NOTES
Patient is here with increased SOB while wearing her CPAP mask. Pt notes this was her second night wearing the mask, and reports experiencing similar onset of SOB while wearing mask for the first time last night but SOB resolved last night after removing mask and placing NC back on.

## 2017-02-03 NOTE — ED NOTES
Patient waiting for family to come pick her up. This RN spoke with the patients daughter and she is working on arranging transportation for the patient.

## 2017-02-03 NOTE — ED NOTES
Patients son arrived to take patient home. Patient assisted into wheelchair. Assisted patient to sons car.  Patient discharged

## 2017-02-03 NOTE — DISCHARGE INSTRUCTIONS
Arthritis: Care Instructions  Your Care Instructions  Arthritis, also called osteoarthritis, is a breakdown of the cartilage that cushions your joints. When the cartilage wears down, your bones rub against each other. This causes pain and stiffness. Many people have some arthritis as they age. Arthritis most often affects the joints of the spine, hands, hips, knees, or feet. You can take simple measures to protect your joints, ease your pain, and help you stay active. Follow-up care is a key part of your treatment and safety. Be sure to make and go to all appointments, and call your doctor if you are having problems. It's also a good idea to know your test results and keep a list of the medicines you take. How can you care for yourself at home? · Stay at a healthy weight. Being overweight puts extra strain on your joints. · Talk to your doctor or physical therapist about exercises that will help ease joint pain. ¨ Stretch. You may enjoy gentle forms of yoga to help keep your joints and muscles flexible. ¨ Walk instead of jog. Other types of exercise that are less stressful on the joints include riding a bicycle, swimming, or water exercise. ¨ Lift weights. Strong muscles help reduce stress on your joints. Stronger thigh muscles, for example, take some of the stress off of the knees and hips. Learn the right way to lift weights so you do not make joint pain worse. · Take your medicines exactly as prescribed. Call your doctor if you think you are having a problem with your medicine. · Take pain medicines exactly as directed. ¨ If the doctor gave you a prescription medicine for pain, take it as prescribed. ¨ If you are not taking a prescription pain medicine, ask your doctor if you can take an over-the-counter medicine. · Use a cane, crutch, walker, or another device if you need help to get around. These can help rest your joints.  You also can use other things to make life easier, such as a higher toilet seat and padded handles on kitchen utensils. · Do not sit in low chairs, which can make it hard to get up. · Put heat or cold on your sore joints as needed. Use whichever helps you most. You also can take turns with hot and cold packs. ¨ Apply heat 2 or 3 times a day for 20 to 30 minutes--using a heating pad, hot shower, or hot pack--to relieve pain and stiffness. ¨ Put ice or a cold pack on your sore joint for 10 to 20 minutes at a time. Put a thin cloth between the ice and your skin. When should you call for help? Call your doctor now or seek immediate medical care if:  · You have sudden swelling, warmth, or pain in any joint. · You have joint pain and a fever or rash. · You have such bad pain that you cannot use a joint. Watch closely for changes in your health, and be sure to contact your doctor if:  · You have mild joint symptoms that continue even with more than 6 weeks of care at home. · You have stomach pain or other problems with your medicine. Where can you learn more? Go to http://rivka-julianna.info/. Enter W865 in the search box to learn more about \"Arthritis: Care Instructions. \"  Current as of: February 24, 2016  Content Version: 11.1  © 5577-6310 [employer]. Care instructions adapted under license by [email or web address] (which disclaims liability or warranty for this information). If you have questions about a medical condition or this instruction, always ask your healthcare professional. [place] [address] any warranty or liability for your use of this information.

## 2017-02-03 NOTE — ED PROVIDER NOTES
HPI Comments: Alva Vicente is a 80 y.o. Female with hx of COPD, OMA, CHF, and HTN who presents via EMS to the ED c/o SOB since this AM.  She reports suddenly feeling increased SOB while wearing her CPAP mask. Pt notes this was her second night wearing the mask, and reports experiencing similar onset of SOB while wearing mask for the first time last night but SOB resolved last night after removing mask and placing NC back on (2.5L NC at baseline). Prior to calling EMS, she endorses doing a neb tx at home without relief (normally does 3 neb txs/day). EMS reports pt having an SpO2 of 86% while on CPAP, which improved to 100% after duo-neb with subjective relief in SOB. Pt notes experiencing anxiousness after onset of her SOB tonight but this has also improved. She also c/o chronic left hip pain s/p fracture 2 years ago. Pt states she fractured her hip while in a hospital in [place], but since fracture pt has been wheelchair bound due to being unable ambulate. Of note, pt lives with grandson. She specifically denies fevers, chills, cough, or CP. PCP: Taty Gutierrez MD    There are no other complaints, changes or physical findings at this time. The history is provided by the patient and the EMS personnel.         Past Medical History:   Diagnosis Date    CHF (congestive heart failure) (HCC)     Chronic obstructive pulmonary disease (HCC)     Edema     Hip fracture, left (HCC)     Hypertension     Morbid obesity with BMI of 40.0-44.9, adult ([address]     OMA (obstructive sleep apnea)     Protrusio acetabuli      left       Past Surgical History:   Procedure Laterality Date    Pr chest surgery procedure unlisted  7/2015     right lung collapse    Hx other surgical  2000     abdominal cyst removed    Hx orthopaedic       Kyphoplasty          Family History:   Problem Relation Age of Onset    Liver Disease Brother        Social History     Social History    Marital status:      Spouse name: N/A    Number of children: N/A    Years of education: N/A     Occupational History    Not on file. Social History Main Topics    Smoking status: Never Smoker    Smokeless tobacco: Not on file    Alcohol use No    Drug use: Not on file    Sexual activity: Not on file     Other Topics Concern    Not on file     Social History Narrative         ALLERGIES: Sulfa (sulfonamide antibiotics)    Review of Systems   Constitutional: Negative for activity change, appetite change, fatigue and fever. HENT: Negative. Negative for congestion, rhinorrhea and sore throat. Respiratory: Positive for shortness of breath. Negative for cough and wheezing. Cardiovascular: Negative. Negative for chest pain and leg swelling. Gastrointestinal: Negative. Negative for abdominal distention, abdominal pain, constipation, diarrhea, nausea and vomiting. Endocrine: Negative. Genitourinary: Negative for difficulty urinating, dysuria, menstrual problem, vaginal bleeding and vaginal discharge. Musculoskeletal: Positive for arthralgias (left hip) and gait problem. Negative for joint swelling and myalgias. Skin: Negative. Negative for rash. Neurological: Negative for dizziness, weakness, light-headedness and headaches. Psychiatric/Behavioral: The patient is nervous/anxious. Patient Vitals for the past 12 hrs:   Temp Pulse Resp BP SpO2   02/03/17 0530 - 81 16 142/57 97 %   02/03/17 0515 - 83 17 143/55 95 %   02/03/17 0500 - 77 18 152/62 97 %   02/03/17 0445 - 78 16 147/65 95 %   02/03/17 0431 - - - - 98 %   02/03/17 0430 - 77 17 149/59 98 %   02/03/17 0400 - 73 17 136/64 98 %   02/03/17 0331 97.9 °F (36.6 °C) 79 19 163/73 97 %         Physical Exam   Constitutional: She is oriented to person, place, and time. She appears well-developed and well-nourished. HENT:   Head: Atraumatic. Eyes: EOM are normal.   Cardiovascular: Normal rate, regular rhythm, normal heart sounds and intact distal pulses.   Exam reveals no gallop and no friction rub. No murmur heard. Pulmonary/Chest: Effort normal. No respiratory distress. She has no wheezes. She has no rales. She exhibits no tenderness. SpO2 greater than 95% on RA. No increased work of breathing. No accessory muscle usage. Speaking in full sentences. Slight prolonged expiratory phase with faint expiratory wheeze. No focal adventitious sounds   Abdominal: Soft. Bowel sounds are normal. She exhibits no distension and no mass. There is no tenderness. There is no rebound and no guarding. Musculoskeletal: Normal range of motion. She exhibits no tenderness. Diffuse 1+ BLE edema extending into BL feet   Neurological: She is oriented to person, place, and time. Skin: Skin is warm. Psychiatric: She has a normal mood and affect. Nursing note and vitals reviewed. MDM  Number of Diagnoses or Management Options  Diagnosis management comments: Based on story and presentation, appears there is a CPAP malfunction given symptoms resolving with removal of CPAP mask for the past 2 nights. Also possible PNA vs COPD       Amount and/or Complexity of Data Reviewed  Clinical lab tests: ordered and reviewed  Tests in the radiology section of CPT®: ordered and reviewed  Tests in the medicine section of CPT®: ordered and reviewed  Obtain history from someone other than the patient: yes (EMS)  Review and summarize past medical records: yes  Independent visualization of images, tracings, or specimens: yes    Patient Progress  Patient progress: stable    ED Course       Procedures    EKG interpretation: (Preliminary)  03:54  Rhythm: normal sinus rhythm; and regular . Rate (approx.): 73;  Axis: left axis deviation; MI interval: normal; QRS interval: normal ; ST/T wave: normal;  Written by Christiana Marcelo ED Scribe, as dictated by Dary Doss MD     LABORATORY TESTS:  Recent Results (from the past 12 hour(s))   EKG, 12 LEAD, INITIAL    Collection Time: 02/03/17  3:54 AM Result Value Ref Range    Ventricular Rate 73 BPM    Atrial Rate 73 BPM    P-R Interval 192 ms    QRS Duration 106 ms    Q-T Interval 398 ms    QTC Calculation (Bezet) 438 ms    Calculated P Axis 44 degrees    Calculated R Axis -31 degrees    Calculated T Axis 24 degrees    Diagnosis       Normal sinus rhythm  Left axis deviation  Low voltage QRS  Possible Anterolateral infarct (cited on or before 03-FEB-2017)  Abnormal ECG  When compared with ECG of 23-DEC-2015 14:19,  Questionable change in initial forces of Lateral leads     CBC WITH AUTOMATED DIFF    Collection Time: 02/03/17  4:13 AM   Result Value Ref Range    WBC 8.4 3.6 - 11.0 K/uL    RBC 3.99 3.80 - 5.20 M/uL    HGB 10.7 (L) 11.5 - 16.0 g/dL    HCT 34.6 (L) 35.0 - 47.0 %    MCV 86.7 80.0 - 99.0 FL    MCH 26.8 26.0 - 34.0 PG    MCHC 30.9 30.0 - 36.5 g/dL    RDW 16.7 (H) 11.5 - 14.5 %    PLATELET 541 155 - 402 K/uL    NEUTROPHILS 61 32 - 75 %    LYMPHOCYTES 27 12 - 49 %    MONOCYTES 9 5 - 13 %    EOSINOPHILS 2 0 - 7 %    BASOPHILS 1 0 - 1 %    ABS. NEUTROPHILS 5.1 1.8 - 8.0 K/UL    ABS. LYMPHOCYTES 2.3 0.8 - 3.5 K/UL    ABS. MONOCYTES 0.8 0.0 - 1.0 K/UL    ABS. EOSINOPHILS 0.2 0.0 - 0.4 K/UL    ABS. BASOPHILS 0.0 0.0 - 0.1 K/UL   METABOLIC PANEL, COMPREHENSIVE    Collection Time: 02/03/17  4:13 AM   Result Value Ref Range    Sodium 141 136 - 145 mmol/L    Potassium 3.9 3.5 - 5.1 mmol/L    Chloride 100 97 - 108 mmol/L    CO2 32 21 - 32 mmol/L    Anion gap 9 5 - 15 mmol/L    Glucose 116 (H) 65 - 100 mg/dL    BUN 16 6 - 20 MG/DL    Creatinine 0.81 0.55 - 1.02 MG/DL    BUN/Creatinine ratio 20 12 - 20      GFR est AA >60 >60 ml/min/1.73m2    GFR est non-AA >60 >60 ml/min/1.73m2    Calcium 8.4 (L) 8.5 - 10.1 MG/DL    Bilirubin, total 0.2 0.2 - 1.0 MG/DL    ALT (SGPT) 20 12 - 78 U/L    AST (SGOT) 21 15 - 37 U/L    Alk.  phosphatase 109 45 - 117 U/L    Protein, total 7.7 6.4 - 8.2 g/dL    Albumin 3.0 (L) 3.5 - 5.0 g/dL    Globulin 4.7 (H) 2.0 - 4.0 g/dL    A-G Ratio 0.6 (L) 1.1 - 2.2     PRO-BNP    Collection Time: 02/03/17  4:13 AM   Result Value Ref Range    NT pro- 0 - 450 PG/ML   TROPONIN I    Collection Time: 02/03/17  4:13 AM   Result Value Ref Range    Troponin-I, Qt. <0.04 <0.05 ng/mL   CK W/ REFLX CKMB    Collection Time: 02/03/17  4:13 AM   Result Value Ref Range     26 - 192 U/L       IMAGING RESULTS:  XR HIP LT W OR WO PELV 2-3 VWS   Final Result     EXAM: XR HIP LT W OR WO PELV 2-3 VWS     INDICATION: prior fracture per pt that never healed, with continued  pain/inability to ambulate.     COMPARISON: None.     FINDINGS: An AP view of the pelvis and a frogleg lateral view of the left hip  performed portably due to patient's inability to ambulate. These are  significantly limited by underpenetration due to body habitus and portable  technique. Left hip osteoarthritis is severe.     IMPRESSION  IMPRESSION: Left hip osteoarthritis. Portable images are significantly  underpenetrated and nondiagnostic for fracture.           XR CHEST PORT   Final Result     PORTABLE CHEST RADIOGRAPH/S: 2/3/2017 4:02 AM     INDICATION: Chest pain. HISTORY (per electronic medical record): Dyspnea: The patient states that she  got new masses for her CPAP machine and has been waking up the last 2 nights  unable to breathe.     COMPARISON: 12/25/2015, 9/15/2015.     TECHNIQUE: Portable frontal semiupright radiograph/s of the chest.     FINDINGS:   The lungs are clear and the central airways are patent. No pneumothorax or  pleural effusion.      IMPRESSION  IMPRESSION:   Clear lungs.                MEDICATIONS GIVEN:  Medications   albuterol-ipratropium (DUO-NEB) 2.5 MG-0.5 MG/3 ML (3 mL Nebulization Given 2/3/17 0359)       IMPRESSION:  1. Chronic obstructive pulmonary disease, unspecified COPD type ([address]    2. OMA (obstructive sleep apnea)    3. Malfunction of device, initial encounter    4. Pain of left hip joint    5. Arthritis of left hip    6.  Oxygen dependent PLAN:  1. Discharge home  Current Discharge Medication List      CONTINUE these medications which have NOT CHANGED    Details   oxyCODONE IR (ROXICODONE) 5 mg immediate release tablet Take 1 Tab by mouth every four (4) hours as needed for Pain. Max Daily Amount: 30 mg.  Qty: 10 Tab, Refills: 0      apixaban (ELIQUIS) 5 mg tablet Take 5 mg by mouth two (2) times a day. Indications: PREVENT THROMBOEMBOLISM WITH CHRONIC ATRIAL FIBRILLATION      pregabalin (LYRICA) 75 mg capsule Take 1 Cap by mouth three (3) times daily. Max Daily Amount: 225 mg.  Qty: 90 Cap, Refills: 0      bumetanide (BUMEX) 0.5 mg tablet Take 1 mg by mouth two (2) times a day. potassium chloride SR (KLOR-CON 10) 10 mEq tablet Take 40 mEq by mouth daily. fluticasone-vilanterol (BREO ELLIPTA) 100-25 mcg/dose inhaler Take 1 Puff by inhalation daily. psyllium (METAMUCIL) packet Take 1 Packet by mouth daily as needed (constipation). senna (SENNA) 8.6 mg tablet Take 2 Tabs by mouth nightly as needed for Constipation. omeprazole (PRILOSEC) 20 mg capsule Take 20 mg by mouth Daily (before dinner). magnesium hydroxide (TRINIDAD MILK OF MAGNESIA) 400 mg/5 mL suspension Take 30 mL by mouth daily as needed for Constipation. lisinopril (PRINIVIL, ZESTRIL) 20 mg tablet Take 20 mg by mouth daily. acetaZOLAMIDE (DIAMOX) 125 mg tablet Take 125 mg by mouth daily. For glaucoma      atorvastatin (LIPITOR) 40 mg tablet Take 40 mg by mouth nightly. latanoprost (XALATAN) 0.005 % ophthalmic solution Administer 1 Drop to both eyes nightly.            2.   Follow-up Information     Follow up With Details Comments Contact Shannon Peters MD Call today TO DISCUSS THE ISSUES WITH YOUR CPAP MACHINE OR CALL THE PROVIDER OF THE MACHINE DIRECTLY [address] Soraida Lugo MD Schedule an appointment as soon as possible for a visit in 1 week TO [address]  [phone]      [facility] EMERGENCY DEPT  As needed, If symptoms worsen [address]  [address]   [address] [phone]      Return to ED if worse     DISCHARGE NOTE  5:33 AM  The patient has been re-evaluated and is ready for discharge. Reviewed available results with patient. Counseled pt on diagnosis and care plan. Pt has expressed understanding, and all questions have been answered. Pt agrees with plan and agrees to F/U as recommended, or return to the ED if their sxs worsen. Discharge instructions have been provided and explained to the pt, along with reasons to return to the ED. This note is prepared by Snehal Sams, acting as Scribe for America Valdivia MD.    America Valdivia MD: The scribe's documentation has been prepared under my direction and personally reviewed by me in its entirety. I confirm that the note above accurately reflects all work, treatment, procedures, and medical decision making performed by me.

## 2017-02-15 PROBLEM — G93.1 ANOXIC BRAIN INJURY (HCC): Status: ACTIVE | Noted: 2017-01-01

## 2017-02-15 PROBLEM — R09.2 RESPIRATORY ARREST (HCC): Status: ACTIVE | Noted: 2017-01-01

## 2017-02-15 NOTE — ED PROVIDER NOTES
HPI Comments: Rakel Lowe is a 80 y.o. F with PMHx significant for HTN / Edema / CHF / COPD who presents unresponsive to [address] [place] [address] ED in cardiac arrest. She visited [address] [place] [address] ED on 2/14/17 for a broken CPAP machine, noting no other complaints. Pt's son was driving her home on [id number] after discharge from ED on morning of 2/15/17 when she stopped breathing, became unresponsive and went into cardiac arrest. Son then turned the car around and headed back to ED, noting pt had not been breathing for ~10 minutes PTA to ED. Son reports that pt is oxygen-dependent at home. Pt was given 3 Atropine and 3 Epinephrine while in ED. Patient's history is limited secondary to acuity of condition. The history is provided by a relative (Son). The history is limited by the condition of the patient. Past Medical History:   Diagnosis Date    CHF (congestive heart failure) (HCC)     Chronic obstructive pulmonary disease (HCC)     Edema     Hip fracture, left (HCC)     Hypertension     Morbid obesity with BMI of 40.0-44.9, adult ([facility] [address]     OMA (obstructive sleep apnea)     Protrusio acetabuli      left       Past Surgical History:   Procedure Laterality Date    Pr chest surgery procedure unlisted  7/2015     right lung collapse    Hx other surgical  2000     abdominal cyst removed    Hx orthopaedic       Kyphoplasty          Family History:   Problem Relation Age of Onset    Liver Disease Brother        Social History     Social History    Marital status:      Spouse name: N/A    Number of children: N/A    Years of education: N/A     Occupational History    Not on file.      Social History Main Topics    Smoking status: Never Smoker    Smokeless tobacco: Not on file    Alcohol use No    Drug use: Not on file    Sexual activity: Not on file     Other Topics Concern    Not on file     Social History Narrative         ALLERGIES: Sulfa (sulfonamide antibiotics)    Review of Systems   Unable to perform ROS: Acuity of condition     Patient Vitals for the past 24 hrs:   Pulse Resp BP SpO2   02/15/17 0902 - - 118/87 -   02/15/17 0830 91 16 140/86 100 %   02/15/17 0800 93 16 (!) 136/94 99 %   02/15/17 0737 (!) 101 16 - 99 %   02/15/17 0711 (!) 133 16 - 100 %   02/15/17 0703 (!) 133 (!) 37 - 100 %         Physical Exam   Eyes:   Pupils mid-dilated, non-reactive   Cardiovascular:   Pulseless   Pulmonary/Chest: Apnea noted. CPR in progress   Neurological: She is unresponsive. MDM  Number of Diagnoses or Management Options  Cardiac arrest [facility]):      Amount and/or Complexity of Data Reviewed  Clinical lab tests: ordered and reviewed  Tests in the radiology section of CPT®: ordered and reviewed  Obtain history from someone other than the patient: yes (Son)  Review and summarize past medical records: yes  Discuss the patient with other providers: yes (Dr. Reddy Boykin)    [phone]  Total time providing critical care: 30-74 minutes    ED Course       Procedures    Procedure Note - Orotracheal Intubation:   6:45 AM  Performed by: Bonnie Dumont MD   Indication for procedure: respiratory failure  The patient was orotracheally intubated with a 6.5 cuffed Citizen of [place] endotracheal tube using a Mac 4 blade with direct visualization. Tube was advanced to 20 cm at the teeth. ETT location confirmed by bilateral, symmetric breath sounds, good end-tidal CO2 detector color change , no breath sounds over stomach and chest x-ray visualization. Number of attempts: 1  Complications: none  RSI was not used. The procedure took 1-15 minutes, and pt tolerated well. Written by Jesús Arauz. Joel Gibbs, ED Scribe, as dictated by Bonnie Dumont MD.    Critical Care: The reason for providing this level of medical care for this critically ill patient was due to a critical illness that impaired one or more vital organ systems such that there was a high probability of imminent or life threatening deterioration in the patients condition.  This care involved high complexity decision making to assess, manipulate, and support vital system functions. PROGRESS NOTE:  8:04 AM  Pt reevaluated. Pt's lactic was 8.2, will do CT. Written by Colton Joseph. Rogers Escamilla, ED Scribe, as dictated by Mil Echevarria MD    CONSULT NOTE:   8:09 Otto Pacheco MD spoke with Dr. Sarah Hendrickson,   Specialty: Hospitalist  Discussed pt's hx, disposition, and available diagnostic and imaging results. Reviewed care plans. Consultant will accept pt for admission. Written by Colton Joseph. Rogers Escamilla, NANNETTE Scribe, as dictated by Mil Echevarria MD.    LABORATORY TESTS:  Recent Results (from the past 12 hour(s))   CBC WITH AUTOMATED DIFF    Collection Time: 02/15/17  7:13 AM   Result Value Ref Range    WBC 15.2 (H) 3.6 - 11.0 K/uL    RBC 4.08 3.80 - 5.20 M/uL    HGB 11.1 (L) 11.5 - 16.0 g/dL    HCT 36.0 35.0 - 47.0 %    MCV 88.2 80.0 - 99.0 FL    MCH 27.2 26.0 - 34.0 PG    MCHC 30.8 30.0 - 36.5 g/dL    RDW 16.5 (H) 11.5 - 14.5 %    PLATELET 894 679 - 826 K/uL    NEUTROPHILS 75 %    BAND NEUTROPHILS 2 %    LYMPHOCYTES 19 %    MONOCYTES 0 %    EOSINOPHILS 0 %    BASOPHILS 0 %    METAMYELOCYTES 4 %    ABS. NEUTROPHILS 11.7 K/UL    ABS. LYMPHOCYTES 2.9 K/UL    ABS. MONOCYTES 0.0 K/UL    ABS. EOSINOPHILS 0.0 K/UL    ABS. BASOPHILS 0.0 K/UL    RBC COMMENTS ANISOCYTOSIS  1+        DF MANUAL     METABOLIC PANEL, COMPREHENSIVE    Collection Time: 02/15/17  7:13 AM   Result Value Ref Range    Sodium 143 136 - 145 mmol/L    Potassium 4.4 3.5 - 5.1 mmol/L    Chloride 104 97 - 108 mmol/L    CO2 22 21 - 32 mmol/L    Anion gap 17 (H) 5 - 15 mmol/L    Glucose 281 (H) 65 - 100 mg/dL    BUN 20 6 - 20 MG/DL    Creatinine 1.12 (H) 0.55 - 1.02 MG/DL    BUN/Creatinine ratio 18 12 - 20      GFR est AA 57 (L) >60 ml/min/1.73m2    GFR est non-AA 47 (L) >60 ml/min/1.73m2    Calcium 7.9 (L) 8.5 - 10.1 MG/DL    Bilirubin, total 0.3 0.2 - 1.0 MG/DL    ALT (SGPT) 100 (H) 12 - 78 U/L    AST (SGOT) 144 (H) 15 - 37 U/L    Alk. phosphatase 121 (H) 45 - 117 U/L    Protein, total 7.4 6.4 - 8.2 g/dL    Albumin 2.9 (L) 3.5 - 5.0 g/dL    Globulin 4.5 (H) 2.0 - 4.0 g/dL    A-G Ratio 0.6 (L) 1.1 - 2.2     CK W/ REFLX CKMB    Collection Time: 02/15/17  7:13 AM   Result Value Ref Range     (H) 26 - 192 U/L   TROPONIN I    Collection Time: 02/15/17  7:13 AM   Result Value Ref Range    Troponin-I, Qt. 0.04 <0.05 ng/mL   LACTIC ACID, PLASMA    Collection Time: 02/15/17  7:13 AM   Result Value Ref Range    Lactic acid 8.2 (HH) 0.4 - 2.0 MMOL/L   CK-MB,QUANT.     Collection Time: 02/15/17  7:13 AM   Result Value Ref Range    CK - MB 3.8 (H) <3.6 NG/ML    CK-MB Index 1.7 0 - 2.5     BLOOD GAS, ARTERIAL    Collection Time: 02/15/17  7:17 AM   Result Value Ref Range    pH 7.21 (LL) 7.35 - 7.45      PCO2 59 (H) 35.0 - 45.0 mmHg    PO2 235 (H) 80 - 100 mmHg    O2 SAT 99 (H) 92 - 97 %    BICARBONATE 23 22 - 26 mmol/L    BASE DEFICIT 5.8 mmol/L    O2 METHOD VENTILATOR      FIO2 100 %    MODE A/C      Tidal volume 500      SET RATE 16      EPAP/CPAP/PEEP 5.0      Sample source ARTERIAL      SITE RIGHT RADIAL      GIL'S TEST YES      Critical value read back Luisito Gastelum MD        IMAGING RESULTS:  XR CHEST PORT   Final Result      CT HEAD WO CONT    (Results Pending)   XR CHEST PORT    (Results Pending)       MEDICATIONS GIVEN:  Medications   sodium chloride (NS) flush 5-10 mL (not administered)   sodium chloride (NS) flush 5-10 mL (not administered)   0.9% sodium chloride infusion (not administered)   acetaminophen (TYLENOL) tablet 650 mg (not administered)   ondansetron (ZOFRAN) injection 4 mg (not administered)   insulin lispro (HUMALOG) injection (not administered)   glucose chewable tablet 16 g (not administered)   dextrose (D50W) injection syrg 12.5-25 g (not administered)   glucagon (GLUCAGEN) injection 1 mg (not administered)   LORazepam (ATIVAN) injection 2 mg (not administered)   pantoprazole (PROTONIX) 40 mg in sodium chloride 0.9 % 10 mL injection (not administered)   apixaban (ELIQUIS) tablet 5 mg (not administered)   fluticasone-vilanterol (BREO ELLIPTA) 100mcg-25mcg/puff (not administered)   latanoprost (XALATAN) 0.005 % ophthalmic solution 1 Drop (not administered)   acetaZOLAMIDE (DIAMOX) tablet 125 mg (not administered)   albuterol-ipratropium (DUO-NEB) 2.5 MG-0.5 MG/3 ML (not administered)   levoFLOXacin (LEVAQUIN) 750 mg in D5W IVPB (not administered)   sodium chloride 0.9 % bolus infusion 1,000 mL (not administered)   LORazepam (ATIVAN) injection 2 mg (2 mg IntraVENous Given 2/15/17 0842)       IMPRESSION:  1. Cardiac arrest ([facility] [address]        PLAN:  1. Admit to hospital    8:09 AM  Patient is being admitted to the hospital by Dr. Garrett Calvert. The results of their tests and reasons for their admission have been discussed with them and/or available family. They convey agreement and understanding for the need to be admitted and for their admission diagnosis. Consultation has been made with the inpatient physician specialist for hospitalization. Written by Felipe Khan. Arnaldo Bruno, ED Scribe, as dictated by Wilmar Arnett MD.        Attestations: This note is prepared by Felipe Khan. Marcelle, acting as Scribe for Wilmar Arnett MD.    Wilmar Arnett MD: The scribe's documentation has been prepared under my direction and personally reviewed by me in its entirety. I confirm that the note above accurately reflects all work, treatment, procedures, and medical decision making performed by me.

## 2017-02-15 NOTE — ED NOTES
Bedside and Verbal shift change report given to James Kent RN (oncoming nurse) by Carlos Molina RN (offgoing nurse). Report included the following information SBAR, Kardex, ED Summary, [facility] and Recent Results.

## 2017-02-15 NOTE — ED PROVIDER NOTES
HPI Comments: Hector Doran is a 80 y.o. female with hx significant for CHF, COPD, and asthma on 2L NC at baseline who presents ambulatory to ED [facility] ED with cc of gradually worsening, constant SOB since this morning. Pt also c/o productive cough with yellow/white sputum at baseline. She also had a transient episode of nonradiating chest pain this afternoon which started while walking. Pt reports the pain lasted 30 minutes and resolved on its own. Pt reports that she sleeps with BiPAP every night and notes that she woke up this morning and discovered her machine is broken. Pt reports a supplier will be coming to her home tomorrow to give her a new BiPAP machine. However, pt became worried about sleeping tonight and called her pulmonologist who recommended she come to the ED for possible admission for the night. Pt additionally has done 5 breathing treatments at home today, with no relief. Pt reports she is currently on Prednisone prescribed by her pulmonologist. Pt denies having a pacemaker. Pt denies increased leg swelling, nausea, vomiting, diarrhea, fever, or abdominal pain. Pulmonologist: FOUZIA Kathleen MD ([facility])    Social Hx: -tobacco, -EtOH, -illicit drug usage    There are no other complaints, changes or physical findings at this time. The history is provided by the patient.         Past Medical History:   Diagnosis Date    CHF (congestive heart failure) (HCC)     Chronic obstructive pulmonary disease (HCC)     Edema     Hip fracture, left (HCC)     Hypertension     Morbid obesity with BMI of 40.0-44.9, adult ([address]     OMA (obstructive sleep apnea)     Protrusio acetabuli      left       Past Surgical History:   Procedure Laterality Date    Pr chest surgery procedure unlisted  7/2015     right lung collapse    Hx other surgical  2000     abdominal cyst removed    Hx orthopaedic       Kyphoplasty          Family History:   Problem Relation Age of Onset    Liver Disease Brother        Social History     Social History    Marital status:      Spouse name: N/A    Number of children: N/A    Years of education: N/A     Occupational History    Not on file. Social History Main Topics    Smoking status: Never Smoker    Smokeless tobacco: Not on file    Alcohol use No    Drug use: Not on file    Sexual activity: Not on file     Other Topics Concern    Not on file     Social History Narrative         ALLERGIES: Sulfa (sulfonamide antibiotics)    Review of Systems   Constitutional: Negative for chills and fever. HENT: Negative for congestion. Eyes: Negative for visual disturbance. Respiratory: Positive for cough and shortness of breath. Negative for chest tightness. Cardiovascular: Positive for chest pain. Negative for leg swelling. Gastrointestinal: Negative for abdominal pain, diarrhea, nausea and vomiting. Endocrine: Negative for polyuria. Genitourinary: Negative for dysuria and frequency. Musculoskeletal: Negative for myalgias. Skin: Negative for color change. Allergic/Immunologic: Negative for immunocompromised state. Neurological: Negative for numbness.        Patient Vitals for the past 24 hrs:   Temp Pulse Resp BP SpO2   02/15/17 0500 - 71 19 (!) 177/94 96 %   02/15/17 0400 - 81 17 185/88 92 %   02/15/17 0300 - 79 20 172/87 91 %   02/15/17 0230 - 82 28 - 95 %   02/15/17 0200 - - - (!) 182/94 96 %   02/15/17 0159 - 82 26 - -   02/15/17 0130 - 78 28 - 96 %   02/15/17 0100 - 80 22 162/80 95 %   02/15/17 0030 - 85 24 155/70 96 %   02/15/17 0015 - 87 22 (!) 137/99 97 %   02/15/17 0001 - 93 21 138/73 97 %   02/14/17 2345 - 91 20 132/83 98 %   02/14/17 2332 - 93 23 - 99 %   02/14/17 2315 - 87 21 167/86 99 %   02/14/17 2300 - 88 21 163/77 97 %   02/14/17 2245 - 88 24 163/84 97 %   02/14/17 2230 - 84 20 157/86 95 %   02/14/17 2225 - 93 - (!) 186/98 97 %   02/14/17 2130 - 94 - 118/64 97 %   02/14/17 2115 - 91 14 112/74 98 %   02/14/17 2105 - 87 16 122/67 98 %   02/14/17 2051 - - - 152/87 -   02/14/17 1850 - (!) 112 18 162/85 94 %   02/14/17 1741 98 °F (36.7 °C) (!) 113 16 163/90 95 %             Physical Exam   Nursing note and vitals reviewed. General appearance: non-toxic, NAD  Eyes: PERRL, EOMI, conjunctiva normal, anicteric sclera  HEENT: mucous membranes moist, oropharynx is clear  Pulmonary: decreased air exchange, slight coarse breath sounds to the bases, anterior chest wall TTP  Cardiac: normal rate and regular rhythm, no murmurs, gallops, or rubs, 2+ peripheral pulses  Abdomen: soft, nontender, nondistended, bowel sounds present  MSK: trace pre-tibial edema bilaterally (chronic per pt)  Neuro: Alert, answers questions appropriately    MDM  Number of Diagnoses or Management Options  Diagnosis management comments: DDx: COPD exacerbation, bronchitis, pneumonia, ACS       Amount and/or Complexity of Data Reviewed  Clinical lab tests: reviewed and ordered  Tests in the radiology section of CPT®: ordered and reviewed  Tests in the medicine section of CPT®: ordered and reviewed  Review and summarize past medical records: yes  Independent visualization of images, tracings, or specimens: yes    Patient Progress  Patient progress: stable      Procedures    EKG interpretation: (Preliminary) 17:48  Rhythm: sinus tachycardia; and regular . Rate (approx.): 109; Axis: left axis deviation; P wave: normal; QRS interval: normal ; ST/T wave: normal; DC interval 166 ms,  ms,  ms, QTc 457 ms. Other: L anterior fasicular block, poor R wave progression unchanged compared to 2/3/17  Written by Jaqueline Chatman, as dictated by Chung Ba MD.    SIGN OUT NOTE:  12:13 AM  Patient's presentation, labs/imaging and plan of care was reviewed with Xin Ramirez MD as part of sign out. They will consult with case management in regards to pt's BiPAP as part of the plan discussed with the patient.     Xin Ramirez MD's assistance in completion of this plan is greatly appreciated but it should be noted that I will be the provider of record for this patient. Nilda Holley MD      ATTESTATION:  The above portions of this note is prepared by Sylvia Zambrano, acting as Scribe for Nilda Holley MD.    Nilda Holley MD: The scribe's documentation has been prepared under my direction and personally reviewed by me in its entirety. I confirm that the note above accurately reflects all work, treatment, procedures, and medical decision making performed by me. PROGRESS NOTE:  11:34 PM  Pt reevaluated. Pt ambulated in hallway with RN and ED tech with oxygen at 2 L via NC and pt's personal rollator walker. Pt tolerated well. Pt's oxygen sats ranged from 92%-100%. Written by NANNETTE Gee, as dictated by Lavaun Holter, MD    PROGRESS NOTE:  5:20 AM  Pt reevaluated. Pt resting comfortably in bed on 2L NC. SpO2 95%. Pt states her new CPAP is scheduled for delivery today. Pt reports she feels comfortable going home at this time. Will contact son to come pick her up. Written by NANNETTE Geibe, as dictated by Lavaun Holter, MD    PROGRESS NOTE:  6:08 AM  Pt reevaluated. Pt states she feels better at this time and is ready for discharge. She notes her son is here to pick her up. Pt SpO2 remains at 95% on 2L NC. Updated pt on all final lab and imaging findings. Plan to discharge according to plan by Joe Jimenez. Bethanie Enamorado MD.  Pt ambulated 1/2 to door with nursing staff before she felt a little short of breath; pt notes this is baseline for her but nursing then offered to take her the rest of the way with a wheelchair, pt happily accepted and was uneventfully taken to her son's car. Pt appeared well, no acute distress.    Written by NANNETTE Ge, as dictated by Lavaun Holter, MD    LABORATORY TESTS:  Recent Results (from the past 12 hour(s))   CBC WITH AUTOMATED DIFF    Collection Time: 02/14/17  9:02 PM   Result Value Ref Range    WBC 13.1 (H) 3.6 - 11.0 K/uL    RBC 4.10 3.80 - 5.20 M/uL    HGB 11.2 (L) 11.5 - 16.0 g/dL    HCT 35.1 35.0 - 47.0 %    MCV 85.6 80.0 - 99.0 FL    MCH 27.3 26.0 - 34.0 PG    MCHC 31.9 30.0 - 36.5 g/dL    RDW 16.5 (H) 11.5 - 14.5 %    PLATELET 680 096 - 264 K/uL    NEUTROPHILS 84 (H) 32 - 75 %    LYMPHOCYTES 10 (L) 12 - 49 %    MONOCYTES 6 5 - 13 %    EOSINOPHILS 0 0 - 7 %    BASOPHILS 0 0 - 1 %    ABS. NEUTROPHILS 11.1 (H) 1.8 - 8.0 K/UL    ABS. LYMPHOCYTES 1.3 0.8 - 3.5 K/UL    ABS. MONOCYTES 0.8 0.0 - 1.0 K/UL    ABS. EOSINOPHILS 0.0 0.0 - 0.4 K/UL    ABS. BASOPHILS 0.0 0.0 - 0.1 K/UL   METABOLIC PANEL, COMPREHENSIVE    Collection Time: 02/14/17  9:02 PM   Result Value Ref Range    Sodium 141 136 - 145 mmol/L    Potassium 3.7 3.5 - 5.1 mmol/L    Chloride 99 97 - 108 mmol/L    CO2 33 (H) 21 - 32 mmol/L    Anion gap 9 5 - 15 mmol/L    Glucose 162 (H) 65 - 100 mg/dL    BUN 20 6 - 20 MG/DL    Creatinine 0.90 0.55 - 1.02 MG/DL    BUN/Creatinine ratio 22 (H) 12 - 20      GFR est AA >60 >60 ml/min/1.73m2    GFR est non-AA >60 >60 ml/min/1.73m2    Calcium 8.9 8.5 - 10.1 MG/DL    Bilirubin, total 0.2 0.2 - 1.0 MG/DL    ALT (SGPT) 27 12 - 78 U/L    AST (SGOT) 23 15 - 37 U/L    Alk. phosphatase 105 45 - 117 U/L    Protein, total 8.0 6.4 - 8.2 g/dL    Albumin 3.5 3.5 - 5.0 g/dL    Globulin 4.5 (H) 2.0 - 4.0 g/dL    A-G Ratio 0.8 (L) 1.1 - 2.2     CK W/ REFLX CKMB    Collection Time: 02/14/17  9:02 PM   Result Value Ref Range     (H) 26 - 192 U/L   TROPONIN I    Collection Time: 02/14/17  9:02 PM   Result Value Ref Range    Troponin-I, Qt. <0.04 <0.05 ng/mL   MAGNESIUM    Collection Time: 02/14/17  9:02 PM   Result Value Ref Range    Magnesium 1.7 1.6 - 2.4 mg/dL   CK-MB,QUANT.     Collection Time: 02/14/17  9:02 PM   Result Value Ref Range    CK - MB 4.0 (H) <3.6 NG/ML    CK-MB Index 1.8 0 - 2.5         IMAGING RESULTS:  CXR Results  (Last 48 hours)               02/14/17 4841  XR CHEST PA LAT Final result Impression:  IMPRESSION: Unchanged left upper lobe scarring/atelectasis. No acute process. Narrative:  INDICATION: Chest pain and dyspnea       COMPARISON: February 3, 2017       FINDINGS: PA and lateral views of the chest demonstrate a stable   cardiomediastinal silhouette. The thoracic aorta remains tortuous. There is   unchanged left upper lobe linear atelectasis or scarring. No new airspace   disease or pleural effusion is evident. Post kyphoplasty changes are again seen   in the lumbar spine. There is an old L1 compression deformity. MEDICATIONS GIVEN:  Medications   albuterol-ipratropium (DUO-NEB) 2.5 MG-0.5 MG/3 ML (3 mL Nebulization Given 2/14/17 2226)   0.9% sodium chloride infusion 500 mL (0 mL IntraVENous IV Completed 2/15/17 0047)   predniSONE (DELTASONE) tablet 60 mg (60 mg Oral Given 2/14/17 2325)   magnesium sulfate 2 g/50 ml IVPB (premix or compounded) (0 g IntraVENous IV Completed 2/15/17 0026)   EPINEPHrine (ADRENALIN) 0.1 mg/mL syringe (1 mg IntraVENous Given 2/15/17 0646)   atropine injection (1 mg IntraVENous Given 2/15/17 0642)       IMPRESSION:  1. Acute exacerbation of chronic obstructive pulmonary disease (COPD) ([facility])        PLAN:  Current Discharge Medication List      START taking these medications    Details   predniSONE (DELTASONE) 20 mg tablet Take 3 Tabs by mouth daily for 4 days. Qty: 12 Tab, Refills: 0         CONTINUE these medications which have NOT CHANGED    Details   oxyCODONE IR (ROXICODONE) 5 mg immediate release tablet Take 1 Tab by mouth every four (4) hours as needed for Pain. Max Daily Amount: 30 mg.  Qty: 10 Tab, Refills: 0      apixaban (ELIQUIS) 5 mg tablet Take 5 mg by mouth two (2) times a day. Indications: PREVENT THROMBOEMBOLISM WITH CHRONIC ATRIAL FIBRILLATION      pregabalin (LYRICA) 75 mg capsule Take 1 Cap by mouth three (3) times daily.  Max Daily Amount: 225 mg.  Qty: 90 Cap, Refills: 0      bumetanide (BUMEX) 0.5 mg tablet Take 1 mg by mouth two (2) times a day. potassium chloride SR (KLOR-CON 10) 10 mEq tablet Take 40 mEq by mouth daily. fluticasone-vilanterol (BREO ELLIPTA) 100-25 mcg/dose inhaler Take 1 Puff by inhalation daily. psyllium (METAMUCIL) packet Take 1 Packet by mouth daily as needed (constipation). senna (SENNA) 8.6 mg tablet Take 2 Tabs by mouth nightly as needed for Constipation. omeprazole (PRILOSEC) 20 mg capsule Take 20 mg by mouth Daily (before dinner). magnesium hydroxide (TRINIDAD MILK OF MAGNESIA) 400 mg/5 mL suspension Take 30 mL by mouth daily as needed for Constipation. lisinopril (PRINIVIL, ZESTRIL) 20 mg tablet Take 20 mg by mouth daily. acetaZOLAMIDE (DIAMOX) 125 mg tablet Take 125 mg by mouth daily. For glaucoma      atorvastatin (LIPITOR) 40 mg tablet Take 40 mg by mouth nightly. latanoprost (XALATAN) 0.005 % ophthalmic solution Administer 1 Drop to both eyes nightly. Follow-up Information     Follow up With Details Comments Contact Info    Phys Other, MD Schedule an appointment as soon as possible for a visit in 2 days  Patient can only remember the practice name and not the physician      [facility] Go in 1 day If symptoms worsen [address] [place] [address]  [phone]        Return to ED if worse     DISCHARGE NOTE:  6:12 AM  The patient's results have been reviewed with family and/or caregiver. They verbally convey their understanding and agreement of the patient's signs, symptoms, diagnosis, treatment, and prognosis. They additionally agree to follow up as recommended in the discharge instructions or to return to the Emergency Room should the patient's condition change prior to their follow-up appointment. The family and/or caregiver verbally agrees with the care-plan and all of their questions have been answered.  The discharge instructions have also been provided to the them along with educational information regarding the patient's diagnosis and a list of reasons why the patient would want to return to the ER prior to their follow-up appointment should their condition change. Written by NANNETTE Mckinnonibe, as dictated by Nevaeh Platt MD.           This note is prepared by Adam Navas, acting as Scribe for MD Nevaeh Tillman MD : The scribe's documentation has been prepared under my direction and personally reviewed by me in its entirety. I confirm that the note above accurately reflects all work, treatment, procedures, and medical decision making performed by me.

## 2017-02-15 NOTE — ED NOTES
Assumed care of patient from [address]  , introduced self to patient as primary nurse at this time. Updated patient on plan of care at this time. Pt has no other questions at this time. Bed in lowest position and locked. Side rails up x2 and call bell within reach.

## 2017-02-15 NOTE — ED NOTES
Pt was d/c from ED, stopped breathing in vehicle going home from ED with son, was brought back to ED and immediately bedded to room 16

## 2017-02-15 NOTE — PROGRESS NOTES
PICC (Peripherally Inserted Central Catheter) line insertion  procedure note :     Procedure explained to patient's son along with risks and benefits  and patient's son agreed to proceed. Informed consent obtained from  Patient;s son. Pt is intubated. .     Patient teaching completed. Timeout completed. Pre-procedure assessment done. Maximum sterile barrier precautions observed throughout procedure. Lidocaine 1%  3.0    ml sq given prior to cannulation. Cannulated basilic  vein using ultrasound guidance and modified seldinger technique. Inserted 6  [place] triple  lumen PICC to right arm using [employer] Tip Location System and  [address]. Pt has    sinus   rhythm. PICC tip location was confirmed by 3 CG tip positioning system, indicating tall P wave and no negative deflection before P wave which would indicate that the PICC tip is properly placed in the distal SVC or at the [place]. PICC tip location was  confirmed by 2 PICC nurses and 3CG printout placed on patient's chart. Blood return verified and flushed with 20 ml normal saline in each port. Sterile dressing applied with biopatch, statLock and occlusive dressing as per protocol. Curos caps applied to each port. Patient tolerated procedure well with minimal blood loss ( less than 5 ml.)   Patient education material provided. PICC procedure performed by  :  George Fuentes RN. PICC nurse  Assisted by : Bishnu Wiley RN  PICC nurse  Reason for access : reliable access / MD order /   Hemodynamically unstable /  Long term IV medication administration / Poor vascular access  / Vesicant IV medication infusion  Complications related to insertion  : none  X-Ray : not applicable  Notified primary nurse  Zeynep Glover RN  that  PICC line can be used.    Total Trimmed Length :  38   cm   External Length : 0  cm   PICC line site arm circumference:  38    cm   PICC catheter occupies  18   % of vein  Type of PICC: Bard Solo Power PICC   Ref # :  [id number]     Lot # :  [id number]   Expiration Date :    2018-04-30     Julianne Ruiz RN. BSN. KRANTHI,CMSRN. Clinician IV .  PICC Nurse, Vascular Access Team.

## 2017-02-15 NOTE — ED NOTES
OG advanced 7cm per MD and x-ray. Patient having involuntary jerking. Eyes deviated up[wards bilaterally, pupils 2mm/Brisk. Md informed new orders received for Ativan.

## 2017-02-15 NOTE — PROGRESS NOTES
Follow-up visit for fam care in CCU.  had seen family earlier in ER. Natalie Shetty (son), Sanjiv Joseph (Daughter-in-law), and Shaun Perez (daughter) were bedside. They shared that there was no change in pt's condition as of yet and were waiting for things to change.  reminded them of continued emotional and spiritual support. Delilah Caruso M.S.   Spiritual Care Department  If need arise please call [facility] ([phone])

## 2017-02-15 NOTE — ED NOTES
Pt assisted to bathroom via WC with oxygen and 1 assist. Pt provided clean catch urine sample. Pt tolerated well.

## 2017-02-15 NOTE — DISCHARGE INSTRUCTIONS
COPD Exacerbation Plan: Care Instructions  Your Care Instructions  If you have chronic obstructive pulmonary disease (COPD), your usual shortness of breath could suddenly get worse. You may start coughing more and have more mucus. This flare-up is called a COPD exacerbation (say \"[id number]-VAR-[id number]-[id number]\"). A lung infection or air pollution could set off an exacerbation. Sometimes it can happen after a quick change in temperature or being around chemicals. Work with your doctor to make a plan for dealing with an exacerbation. You can better manage it if you plan ahead. Follow-up care is a key part of your treatment and safety. Be sure to make and go to all appointments, and call your doctor if you are having problems. It's also a good idea to know your test results and keep a list of the medicines you take. How can you care for yourself at home? During an exacerbation  · Do not panic if you start to have one. Quick treatment at home may help you prevent serious breathing problems. If you have a COPD exacerbation plan that you developed with your doctor, follow it. · Take your medicines exactly as your doctor tells you. ¨ Use your inhaler as directed by your doctor. If your symptoms do not get better after you use your medicine, have someone take you to the emergency room. Call an ambulance if necessary. ¨ With inhaled medicines, a spacer or a nebulizer may help you get more medicine to your lungs. Ask your doctor or pharmacist how to use them properly. Practice using the spacer in front of a mirror before you have an exacerbation. This may help you get the medicine into your lungs quickly. ¨ If your doctor has given you steroid pills, take them as directed. ¨ Your doctor may have given you a prescription for antibiotics, which you can fill if you need it. ¨ Talk to your doctor if you have any problems with your medicine. And call your doctor if you have to use your antibiotic or steroid pills.   Preventing an exacerbation  · Do not smoke. This is the most important step you can take to prevent more damage to your lungs and prevent problems. If you already smoke, it is never too late to stop. If you need help quitting, talk to your doctor about stop-smoking programs and medicines. These can increase your chances of quitting for good. · Take your daily medicines as prescribed. · Avoid colds and flu. ¨ Get a pneumococcal vaccine. ¨ Get a flu vaccine each year, as soon as it is available. Ask those you live or work with to do the same, so they will not get the flu and infect you. ¨ Try to stay away from people with colds or the flu. ¨ Wash your hands often. · Avoid secondhand smoke; air pollution; cold, dry air; hot, humid air; and high altitudes. Stay at home with your windows closed when air pollution is bad. · Learn breathing techniques for COPD, such as breathing through pursed lips. These techniques can help you breathe easier during an exacerbation. When should you call for help? Call 911 anytime you think you may need emergency care. For example, call if:  · You have severe trouble breathing. · You have severe chest pain. Call your doctor now or seek immediate medical care if:  · You have new or worse shortness of breath. · You develop new chest pain. · You are coughing more deeply or more often, especially if you notice more mucus or a change in the color of your mucus. · You cough up blood. · You have new or increased swelling in your legs or belly. · You have a fever. Watch closely for changes in your health, and be sure to contact your doctor if:  · You need to use your antibiotic or steroid pills. · Your symptoms are getting worse. Where can you learn more? Go to http://rivka-julianna.info/. Enter J020 in the search box to learn more about \"COPD Exacerbation Plan: Care Instructions. \"  Current as of: July 21, 2016  Content Version: 11.1  © 0705-7174 [employer]. Care instructions adapted under license by [employer] (which disclaims liability or warranty for this information). If you have questions about a medical condition or this instruction, always ask your healthcare professional. [address] any warranty or liability for your use of this information.

## 2017-02-15 NOTE — PROGRESS NOTES
0945: received patient in room, transferred to ccu bed and connected to monitor. Bedside and verbal report received from [address] [facility], RN  1000: assessment completed, patient withdraws in lower extremities only, eyes deviate upward and to the right, intermittent myoclonic like jerking with all extremities. 1300: patient with bp 218/91, dr. John Buck aware and order placed for fentanyl. Will attempt ativan after if needed for increasing frequency of jerking. Dr. Britt Flores in to see patient. EEG ordered to be done today. 1730: spoke with EEG tech regarding EEG. He spoke with Dr. Britt Flores and is ok to be done tomorrow.    1900: bedside and verbal report given to gabriele roe

## 2017-02-15 NOTE — H&P
Hospitalist Admission Note    NAME: Devin Cisneros   :  1935   MRN:  [id number]     Date/Time:  2/15/2017 8:18 AM    Patient PCP: Vallerie Phalen, MD   Pulmonologist:  Dr. Erin De Leon  ________________________________________________________________________   Assessment & Plan:  Respiratory arrest followed by PEA cardiac arrest, POA  OMA on CPAP  Severe lactic acidosis 8.2, due to hypoperfusion during arrest  Mild elevated LFTs, suspect early shock liver  Hyperglycemia , likely due to steroid and stress  Leukocytosis WBC 15.2 -- no definite source of infection. Sputum culture. Empiric levaquin for pulmonary coverage since patient reported cough and sputum production earlier  Suspected anoxic brain injury with  Upward b/l eye deviation and intermittent generalized body and facial twitching, r/o status epilepticus  P. afib -- on eliquis  Chronic diastolic chf EF 42-16%   HTN    --continue vent, empiric abx with levaquin for pulmonary coverage, get blood cultures, UA, sputum culture  --IVF:  Bolus with 1L and then 100ml/hr, hold bumex  --repeat lactic acid  --await CT head, get neurology consult, IV ativan prn, EEG  --await EKG, trend cardiac enzymes  --SSI. --continue apixaban    Code: full  DVT prophylaxis: apixaban  Surrogate decision maker:  Son. Looked for family in conference room after ER attending spoke to them but no longer present        Subjective:   CHIEF COMPLAINT:  Respiratory arrest    HISTORY OF PRESENT ILLNESS:     Devin Cisneros is a 80 y.o.  female seen in ER last night -- presented with SOB, cough with some sputum and chest pain. Her bipap machine had broken and she was afraid to sleep without one. CXR was normal.  Ambulating O2 sat was %. Patient discharged home around 6am with oral prednisone pulse. Son came to pick her up.   Per ER physician, on drive home, son stopped hearing O2 concentrator going off and looked over to find patient had stopped breathing and turned car back to ER which took about 10 minutes reportedly. On arrival, patient was in PEA and given epi, atropine and pulse found. She was intubated. History above obtained from chart review and discussion with Dr. Noel Clark. Family not at bedside. We were asked to admit for work up and evaluation of the above problems. Past Medical History   Diagnosis Date    CHF (congestive heart failure) (HCC)     Chronic obstructive pulmonary disease (HCC)     Edema     Hip fracture, left (HCC)     Hypertension     Morbid obesity with BMI of 40.0-44.9, adult ([address]     OMA (obstructive sleep apnea)     Protrusio acetabuli      left      Past Surgical History   Procedure Laterality Date    Pr chest surgery procedure unlisted  7/2015     right lung collapse    Hx other surgical  2000     abdominal cyst removed    Hx orthopaedic       Kyphoplasty      Social History   Substance Use Topics    Smoking status: Never Smoker    Smokeless tobacco: Not on file    Alcohol use No     Family History   Problem Relation Age of Onset    Liver Disease Brother      Allergies   Allergen Reactions    Sulfa (Sulfonamide Antibiotics) Unknown (comments)        Prior to Admission medications    Medication Sig Start Date End Date Taking? Authorizing Provider   predniSONE (DELTASONE) 20 mg tablet Take 3 Tabs by mouth daily for 4 days. 2/15/17 2/19/17  Lydia Carpenter. Ziggy Ortega MD   oxyCODONE IR (ROXICODONE) 5 mg immediate release tablet Take 1 Tab by mouth every four (4) hours as needed for Pain. Max Daily Amount: 30 mg. 10/15/16   Jeremiah Morrison MD   [facility]) 5 mg tablet Take 5 mg by mouth two (2) times a day. Indications: PREVENT THROMBOEMBOLISM WITH CHRONIC ATRIAL FIBRILLATION    Historical Provider   pregabalin (LYRICA) 75 mg capsule Take 1 Cap by mouth three (3) times daily.  Max Daily Amount: 225 mg. 6/30/15   Mariya Garza NP   bumetanide (BUMEX) 0.5 mg tablet Take 1 mg by mouth two (2) times a day. Historical Provider   potassium chloride SR (KLOR-CON 10) 10 mEq tablet Take 40 mEq by mouth daily. Historical Provider   fluticasone-vilanterol (BREO ELLIPTA) 100-25 mcg/dose inhaler Take 1 Puff by inhalation daily. Historical Provider   psyllium (METAMUCIL) packet Take 1 Packet by mouth daily as needed (constipation). Historical Provider   senna (SENNA) 8.6 mg tablet Take 2 Tabs by mouth nightly as needed for Constipation. Historical Provider   omeprazole (PRILOSEC) 20 mg capsule Take 20 mg by mouth Daily (before dinner). Historical Provider   magnesium hydroxide (TRINIDAD MILK OF MAGNESIA) 400 mg/5 mL suspension Take 30 mL by mouth daily as needed for Constipation. Historical Provider   lisinopril (PRINIVIL, ZESTRIL) 20 mg tablet Take 20 mg by mouth daily. Ely Powell MD   acetaZOLAMIDE (DIAMOX) 125 mg tablet Take 125 mg by mouth daily. For glaucoma    Ely Powell MD   atorvastatin (LIPITOR) 40 mg tablet Take 40 mg by mouth nightly. Ely Powell MD   latanoprost (XALATAN) 0.005 % ophthalmic solution Administer 1 Drop to both eyes nightly. Ely Powell MD     REVIEW OF SYSTEMS:  POSITIVE= Bold. Negative = normal text  Unable to obtain due to patient intubated      Objective:   VITALS:    Visit Vitals    BP (!) 136/94    Pulse 93    Resp 16    SpO2 99%     Temp (24hrs), Av °F (36.7 °C), Min:98 °F (36.7 °C), Max:98 °F (36.7 °C)    There is no height or weight on file to calculate BMI. PHYSICAL EXAM:    General:    Intubated obese elderly female, appears stated age. HEENT: Atraumatic, eyes deviated upward b/l, anicteric sclerae, pink conjunctivae     Oozing brown liquid from right corner of mouth  Neck:  Supple, symmetrica  Lungs:   Clear to auscultation bilaterally. No Wheezing or Rhonchi. No rales. Chest wall:  No tenderness  No Accessory muscle use. Heart:   Regular  rhythm,  No  murmur   No gallop. No edema.     Abdomen:   Soft, marked distended, tympanitic, hypoactive BS. No masses  Extremities: No cyanosis. No clubbing  Skin:     Not pale Not Jaundiced  No rashes Warm and dry  Neurologic: Eyes deviated upward, + corneal reflex, right pupil larger than left-- 4mm right vs. 3mm left, sluggishly reactive. No response to pain stimuli (did not receive any sedation for intubation or thereafter), frequent brief intermittent generalized jerking of body and face lasting 5 seconds. Peripheral pulse: Bilateral, Radial, 1+  Capillary refill:  normal    IMAGING RESULTS:   []       I have personally reviewed the actual   []     CXR  []     CT scan  CXR:  CT :  EKG: none on chart  ________________________________________________________________________  Care Plan discussed with:    Comments   Patient     Family      RN x    Care Manager                    Consultant:  nghia Saleh Bonkristopher   ________________________________________________________________________  Prophylaxis:  GI PPI   DVT apixaban   ________________________________________________________________________  Recommended Disposition: TBD  _________________________________________________  Code Status:  Full Code y   DNR/DNI    ________________________________________________________________________  TOTAL TIME:  45 minutes critical care      Comments    x Reviewed previous records       ______________________________________________________________________  Mitchell Mora MD      Procedures: see electronic medical records for all procedures/Xrays and details which were not copied into this note but were reviewed prior to creation of Plan.     LAB DATA REVIEWED:    Recent Results (from the past 24 hour(s))   EKG, 12 LEAD, INITIAL    Collection Time: 02/14/17  5:48 PM   Result Value Ref Range    Ventricular Rate 109 BPM    Atrial Rate 109 BPM    P-R Interval 166 ms    QRS Duration 100 ms    Q-T Interval 340 ms    QTC Calculation (Bezet) 457 ms    Calculated P Axis 49 degrees    Calculated R Axis -51 degrees Calculated T Axis 37 degrees    Diagnosis       Sinus tachycardia  Left anterior fascicular block  Possible Anterior infarct (cited on or before 14-FEB-2017)  Abnormal ECG  When compared with ECG of 03-FEB-2017 03:54,  Vent. rate has increased BY  36 BPM     CBC WITH AUTOMATED DIFF    Collection Time: 02/14/17  9:02 PM   Result Value Ref Range    WBC 13.1 (H) 3.6 - 11.0 K/uL    RBC 4.10 3.80 - 5.20 M/uL    HGB 11.2 (L) 11.5 - 16.0 g/dL    HCT 35.1 35.0 - 47.0 %    MCV 85.6 80.0 - 99.0 FL    MCH 27.3 26.0 - 34.0 PG    MCHC 31.9 30.0 - 36.5 g/dL    RDW 16.5 (H) 11.5 - 14.5 %    PLATELET 979 310 - 097 K/uL    NEUTROPHILS 84 (H) 32 - 75 %    LYMPHOCYTES 10 (L) 12 - 49 %    MONOCYTES 6 5 - 13 %    EOSINOPHILS 0 0 - 7 %    BASOPHILS 0 0 - 1 %    ABS. NEUTROPHILS 11.1 (H) 1.8 - 8.0 K/UL    ABS. LYMPHOCYTES 1.3 0.8 - 3.5 K/UL    ABS. MONOCYTES 0.8 0.0 - 1.0 K/UL    ABS. EOSINOPHILS 0.0 0.0 - 0.4 K/UL    ABS. BASOPHILS 0.0 0.0 - 0.1 K/UL   METABOLIC PANEL, COMPREHENSIVE    Collection Time: 02/14/17  9:02 PM   Result Value Ref Range    Sodium 141 136 - 145 mmol/L    Potassium 3.7 3.5 - 5.1 mmol/L    Chloride 99 97 - 108 mmol/L    CO2 33 (H) 21 - 32 mmol/L    Anion gap 9 5 - 15 mmol/L    Glucose 162 (H) 65 - 100 mg/dL    BUN 20 6 - 20 MG/DL    Creatinine 0.90 0.55 - 1.02 MG/DL    BUN/Creatinine ratio 22 (H) 12 - 20      GFR est AA >60 >60 ml/min/1.73m2    GFR est non-AA >60 >60 ml/min/1.73m2    Calcium 8.9 8.5 - 10.1 MG/DL    Bilirubin, total 0.2 0.2 - 1.0 MG/DL    ALT (SGPT) 27 12 - 78 U/L    AST (SGOT) 23 15 - 37 U/L    Alk.  phosphatase 105 45 - 117 U/L    Protein, total 8.0 6.4 - 8.2 g/dL    Albumin 3.5 3.5 - 5.0 g/dL    Globulin 4.5 (H) 2.0 - 4.0 g/dL    A-G Ratio 0.8 (L) 1.1 - 2.2     CK W/ REFLX CKMB    Collection Time: 02/14/17  9:02 PM   Result Value Ref Range     (H) 26 - 192 U/L   TROPONIN I    Collection Time: 02/14/17  9:02 PM   Result Value Ref Range    Troponin-I, Qt. <0.04 <0.05 ng/mL   MAGNESIUM Collection Time: 02/14/17  9:02 PM   Result Value Ref Range    Magnesium 1.7 1.6 - 2.4 mg/dL   CK-MB,QUANT. Collection Time: 02/14/17  9:02 PM   Result Value Ref Range    CK - MB 4.0 (H) <3.6 NG/ML    CK-MB Index 1.8 0 - 2.5     CBC WITH AUTOMATED DIFF    Collection Time: 02/15/17  7:13 AM   Result Value Ref Range    WBC 15.2 (H) 3.6 - 11.0 K/uL    RBC 4.08 3.80 - 5.20 M/uL    HGB 11.1 (L) 11.5 - 16.0 g/dL    HCT 36.0 35.0 - 47.0 %    MCV 88.2 80.0 - 99.0 FL    MCH 27.2 26.0 - 34.0 PG    MCHC 30.8 30.0 - 36.5 g/dL    RDW 16.5 (H) 11.5 - 14.5 %    PLATELET 536 991 - 477 K/uL    NEUTROPHILS 75 %    BAND NEUTROPHILS 2 %    LYMPHOCYTES 19 %    MONOCYTES 0 %    EOSINOPHILS 0 %    BASOPHILS 0 %    METAMYELOCYTES 4 %    ABS. NEUTROPHILS 11.7 K/UL    ABS. LYMPHOCYTES 2.9 K/UL    ABS. MONOCYTES 0.0 K/UL    ABS. EOSINOPHILS 0.0 K/UL    ABS. BASOPHILS 0.0 K/UL    RBC COMMENTS ANISOCYTOSIS  1+        DF MANUAL     METABOLIC PANEL, COMPREHENSIVE    Collection Time: 02/15/17  7:13 AM   Result Value Ref Range    Sodium 143 136 - 145 mmol/L    Potassium 4.4 3.5 - 5.1 mmol/L    Chloride 104 97 - 108 mmol/L    CO2 22 21 - 32 mmol/L    Anion gap 17 (H) 5 - 15 mmol/L    Glucose 281 (H) 65 - 100 mg/dL    BUN 20 6 - 20 MG/DL    Creatinine 1.12 (H) 0.55 - 1.02 MG/DL    BUN/Creatinine ratio 18 12 - 20      GFR est AA 57 (L) >60 ml/min/1.73m2    GFR est non-AA 47 (L) >60 ml/min/1.73m2    Calcium 7.9 (L) 8.5 - 10.1 MG/DL    Bilirubin, total 0.3 0.2 - 1.0 MG/DL    ALT (SGPT) 100 (H) 12 - 78 U/L    AST (SGOT) 144 (H) 15 - 37 U/L    Alk.  phosphatase 121 (H) 45 - 117 U/L    Protein, total 7.4 6.4 - 8.2 g/dL    Albumin 2.9 (L) 3.5 - 5.0 g/dL    Globulin 4.5 (H) 2.0 - 4.0 g/dL    A-G Ratio 0.6 (L) 1.1 - 2.2     CK W/ REFLX CKMB    Collection Time: 02/15/17  7:13 AM   Result Value Ref Range     (H) 26 - 192 U/L   TROPONIN I    Collection Time: 02/15/17  7:13 AM   Result Value Ref Range    Troponin-I, Qt. 0.04 <0.05 ng/mL   LACTIC ACID, PLASMA    Collection Time: 02/15/17  7:13 AM   Result Value Ref Range    Lactic acid 8.2 (HH) 0.4 - 2.0 MMOL/L   CK-MB,QUANT.     Collection Time: 02/15/17  7:13 AM   Result Value Ref Range    CK - MB 3.8 (H) <3.6 NG/ML    CK-MB Index 1.7 0 - 2.5     BLOOD GAS, ARTERIAL    Collection Time: 02/15/17  7:17 AM   Result Value Ref Range    pH 7.21 (LL) 7.35 - 7.45      PCO2 59 (H) 35.0 - 45.0 mmHg    PO2 235 (H) 80 - 100 mmHg    O2 SAT 99 (H) 92 - 97 %    BICARBONATE 23 22 - 26 mmol/L    BASE DEFICIT 5.8 mmol/L    O2 METHOD VENTILATOR      FIO2 100 %    MODE A/C      Tidal volume 500      SET RATE 16      EPAP/CPAP/PEEP 5.0      Sample source ARTERIAL      SITE RIGHT RADIAL      GIL'S TEST YES      Critical value read back Paul Wilson MD

## 2017-02-15 NOTE — ED NOTES
Family at bedside updated on plan of care.   Gold Earrings removed from both ears per family request and given to family

## 2017-02-15 NOTE — ED NOTES
Pt ambulated in hallway with RN and ED tech with oxygen at 2 L via NC and pt's personal rollator walker. Pt tolerated well. Pt's oxygen sats ranged from 92%-100%. Pt placed back in bed with side rails up x 2. Call bell in reach. Dr. Morgan Cristobal notified.

## 2017-02-15 NOTE — CONSULTS
Consult Date: 2/15/2017    IP CONSULT TO NEUROLOGY  Consult performed by: Cipriano Lewis  Consult ordered by: Rafael Gasca          NEUROLOGY HISTORY AND PHYSICAL    Name Ari Miller Age 80 y.o. MRN [id number]  1935     Consulting Physician: Jolanta Pavon MD      Chief Complaint:  Rizwan Perez     This is an 80year old female with a medical history of congestive heart failure and chronic obstructive pulmonary disease. She had been experiencing intermittent shortness of breath for some days. She came to the ER for evaluation and was discharged. Her son picked her up. On the way home she suffered acute cardiopulmonary failure. She was brought back to the ER within ten minutes of symptom onset. She received three rounds of epinephrine and atropine and was admitted to the ICU. She is now intubated. Son is in the room. Discussed treatment plan and considerations. Assessment and Plan    1. Seizure   Lip smacking and jerking   EEG   Will start keppra  2. Cardiac arrest   Intubated  3. Congestive Heart failure     4. Diabetes   On insulin     Patient Allergies    Sulfa (sulfonamide antibiotics)     Medication Dose Route Frequency    acetaminophen (TYLENOL) tablet 650 mg  650 mg Oral Q6H PRN    ondansetron (ZOFRAN) injection 4 mg  4 mg IntraVENous Q6H PRN    insulin lispro (HUMALOG) injection   SubCUTAneous AC&HS    glucose chewable tablet 16 g  4 Tab Oral PRN    dextrose (D50W) injection syrg 12.5-25 g  12.5-25 g IntraVENous PRN    glucagon (GLUCAGEN) injection 1 mg  1 mg IntraMUSCular PRN    LORazepam (ATIVAN) injection 2 mg  2 mg IntraVENous PRN    pantoprazole (PROTONIX) 40 mg in sodium chloride 0.9 % 10 mL injection  40 mg IntraVENous DAILY    fluticasone-vilanterol (BREO ELLIPTA) 100mcg-25mcg/puff  1 Puff Inhalation DAILY    latanoprost (XALATAN) 0.005 % ophthalmic solution 1 Drop  1 Drop Both Eyes QHS    albuterol-ipratropium (DUO-NEB) 2.5 MG-0.5 MG/3 ML  3 mL Nebulization Q6H RT    levoFLOXacin (LEVAQUIN) 750 mg in D5W IVPB  750 mg IntraVENous Q48H    sodium chloride 0.9 % bolus infusion 1,000 mL  1,000 mL IntraVENous ONCE    chlorhexidine (PERIDEX) 0.12 % mouthwash 15 mL  15 mL Oral Q12H    mupirocin (BACTROBAN) 2 % ointment   Topical Q12H    heparin (porcine) pf 300 Units  300 Units InterCATHeter PRN    fentaNYL citrate (PF) injection 25-50 mcg  25-50 mcg IntraVENous Q4H PRN    levETIRAcetam (KEPPRA) 1,000 mg in 0.9% sodium chloride IVPB  1,000 mg IntraVENous Q12H       Diagnosis    CHF (congestive heart failure) (HCC)    Chronic obstructive pulmonary disease (HCC)    Edema    Hip fracture, left (HCC)    Hypertension    Morbid obesity with BMI of 40.0-44.9, adult (HCC)    OMA (obstructive sleep apnea)    Protrusio acetabuli           Social History   Substance Use Topics    Smoking status: Never Smoker    Smokeless tobacco: Not on file    Alcohol use No       Family History   Problem Relation Age of Onset    Liver Disease  No huntingtons Brother      Review of Systems   Unable to perform ROS: Patient unresponsive     Vital signs for last 24 hours:  /84  Pulse 76  Temp 98.5 °F (36.9 °C)  Resp 16  Wt 222 lb 3.6 oz (100.8 kg)  SpO2 100%  BMI 39.37 kg/m2    Physical Exam   Constitutional: She appears well-developed and well-nourished. HENT:   Head: Normocephalic and atraumatic. Eyes: Conjunctivae are normal. Pupils are equal, round, and reactive to light. Neck: Neck supple. No JVD present. No thyromegaly present. Cardiovascular: Normal rate and regular rhythm. Exam reveals no friction rub. No murmur heard. Pulmonary/Chest: Breath sounds normal. She has no wheezes. She has no rales. Abdominal: Soft. Bowel sounds are normal. There is no tenderness. Skin: Skin is warm. No rash noted. Psychiatric: She has a normal mood and affect. Her behavior is normal. Judgment and thought content normal.     Neurological Exam:  Mental Status:  Unresponsinve.  Stimulation esepcially on the left causes nonpurposful whole body jerking. Has lip pursing around tube continuous   Cranial Nerves:  PERRL EOMI + Corneal reflex. + grimace. Eyes: no nystagmus, no ptosis. Motor:  withdraws symmetrically normal bulk   Reflexes:   +2 and symmetric 1+ distal   Sensory:   withdraws times 4   Gait :  intubated. Tremor:   No and tremor. Cerebellar:  Finger to nose was demonstrated competently. Neurovascular: no carotid bruits. No JVD     Imaging     CT Results (most recent):    Results from Hospital Encounter encounter on 02/15/17   CT HEAD WO CONT   Narrative INDICATION: Cerebral ischemia. Apneic episode. Exam: Noncontrast CT of the brain is performed with 5 mm collimation. CT dose reduction was achieved with the use of the standardized protocol  tailored for this examination and automatic exposure control for dose  modulation. Recommend comparison is made to prior CT dated December 2015. FINDINGS: There is mild, age-appropriate diffuse cortical atrophy. There is no  acute intracranial hemorrhage, mass, mass effect or herniation. Ventricular  system is normal. The gray-white matter differentiation is well-preserved. The  mastoid air cells are well pneumatized. The visualized paranasal sinuses are  normal.         Impression IMPRESSION: No acute intracranial hemorrhage, mass or infarct.            Lab Review  Lab Results   Component Value Date/Time    WBC 15.2 02/15/2017 07:13 AM    HCT 36.0 02/15/2017 07:13 AM    HGB 11.1 02/15/2017 07:13 AM    PLATELET 826 97/76/3195 07:13 AM     Lab Results   Component Value Date/Time    Sodium 143 02/15/2017 07:13 AM    Potassium 4.4 02/15/2017 07:13 AM    Chloride 104 02/15/2017 07:13 AM    CO2 22 02/15/2017 07:13 AM    Glucose 281 02/15/2017 07:13 AM    BUN 20 02/15/2017 07:13 AM    Creatinine 1.12 02/15/2017 07:13 AM    Calcium 7.9 02/15/2017 07:13 AM       Lab Results   Component Value Date/Time    Hemoglobin A1c 6.5 10/14/2016 10:31 PM

## 2017-02-15 NOTE — PROGRESS NOTES
Pharmacy Medication Reconciliation     Patient was intubated and could not be interviewed regarding medications. Med list was updated based on Rx Query and by speaking with the patient's son and with [facility] on [address] where her son picks up her Rx's. Recommendations/Findings: The following amendments were made to the patient's active medication list on file at [facility]:     1) Additions: Azithromycin, Ferrous sulfate, Dulera, montelukast, Duonebs    2) Deletions: Acetazolamide, latanoprost    3) Changes: Bumex 0.5 mg --> 1 mg  Klor-Con 10 --> Klor-Con M20  Prednisone 20 mg --> Prednisone 10 mg    Other Findings:  -[facility] reported that son most recently picked up the following medications on 2/11: azithromycin (Dolly Balderas), Dulera, atorvastatin, lisinopril, and prednisone.  -Was told by pt's son that it is ok to contact Karen at [phone], who assists pt with home health.  -Pt's son mentioned that pt may have been switched to a cheaper alternative to Lyrica but could not confirm. Claims for gabapentin show in up RxQuery, however Lyrica was most recently filled and pt has a history of taking Lyrica based on RxQuery.      -Clarified PTA med list with Rx Query, patient's son, and [facility] on [address]. PTA medication list was corrected to the following:     Prior to Admission Medications   Prescriptions Last Dose Informant Patient Reported? Taking? albuterol (PROVENTIL) 5 mg/mL nebulizer solution   Yes Yes   Sig: by Nebulization route once. 1 mL three times daily prn   albuterol-ipratropium (DUO-NEB) 2.5 mg-0.5 mg/3 ml nebu   Yes Yes   Sig: 3 mL by Nebulization route. apixaban (ELIQUIS) 5 mg tablet   Yes No   Sig: Take 5 mg by mouth two (2) times a day. Indications: PREVENT THROMBOEMBOLISM WITH CHRONIC ATRIAL FIBRILLATION   atorvastatin (LIPITOR) 40 mg tablet   Yes No   Sig: Take 40 mg by mouth nightly. azithromycin (ZITHROMAX) 250 mg tablet   Yes Yes   Sig: Take 250 mg by mouth See Admin Instructions.  Z kimmy instructions bumetanide (BUMEX) 1 mg tablet   Yes Yes   Sig: Take 1 mg by mouth two (2) times a day. ferrous sulfate 325 mg (65 mg iron) tablet   Yes Yes   Sig: Take 325 mg by mouth Daily (before breakfast). fluticasone-vilanterol (BREO ELLIPTA) 100-25 mcg/dose inhaler   Yes No   Sig: Take 1 Puff by inhalation daily. lisinopril (PRINIVIL, ZESTRIL) 20 mg tablet   Yes No   Sig: Take 20 mg by mouth daily. magnesium hydroxide (TRINIDAD MILK OF MAGNESIA) 400 mg/5 mL suspension   Yes No   Sig: Take 30 mL by mouth daily as needed for Constipation. mometasone-formoterol (DULERA) 200-5 mcg/actuation HFA inhaler   Yes Yes   Sig: Take 2 Puffs by inhalation two (2) times a day. montelukast (SINGULAIR) 10 mg tablet   Yes Yes   Sig: Take 10 mg by mouth daily. omeprazole (PRILOSEC) 20 mg capsule   Yes No   Sig: Take 20 mg by mouth Daily (before dinner). potassium chloride (KLOR-CON M20) 20 mEq tablet   Yes Yes   Sig: Take 20 mEq by mouth daily. predniSONE (DELTASONE) 10 mg tablet   Yes Yes   Sig: Take 10 mg by mouth. taper   pregabalin (LYRICA) 75 mg capsule   No No   Sig: Take 1 Cap by mouth three (3) times daily. Max Daily Amount: 225 mg.   psyllium (METAMUCIL) packet   Yes No   Sig: Take 1 Packet by mouth daily as needed (constipation). senna (SENNA) 8.6 mg tablet   Yes No   Sig: Take 2 Tabs by mouth nightly as needed for Constipation. Facility-Administered Medications: None          Thank you,  Jaclyn Dupree.  Sammie PharmD [id number]

## 2017-02-15 NOTE — PROGRESS NOTES
Spiritual Care Assessment/Progress Notes    Starr Garcia [id number]  [id number]    1935  80 y.o.  female    Patient Telephone Number: [phone] (home)   Yazdanism Affiliation: Alek Silveira   Language: English   Extended Emergency Contact Information  Primary Emergency Contact: Gianluca Otoole  Address: [address]  [address] Phone: [phone]  Mobile Phone: [phone]  Relation: Son  Secondary Emergency Contact: Jeanette Maldonado [facility] [address]  Mobile Phone: [phone]  Relation: Other Relative   Patient Active Problem List    Diagnosis Date Noted    Respiratory arrest ([facility] 02/15/2017    Chronic obstructive pulmonary disease ([facility] [address] 07/22/2016    Chronic congestive heart failure ([address] 07/22/2016    Acute renal failure (ARF) ([facility] [address] 12/23/2015    Compression fracture 10/24/2015    Acute on chronic respiratory failure with hypercapnia ([address] 06/26/2015    Acute respiratory failure ([address] 05/31/2015        Date: 2/15/2017       Level of Yazdanism/Spiritual Activity:  [x]         Involved in diane tradition/spiritual practice    []         Not involved in diane tradition/spiritual practice  [x]         Spiritually oriented    []         Claims no spiritual orientation    []         seeking spiritual identity  []         Feels alienated from Religion practice/tradition  []         Feels angry about Religion practice/tradition  [x]         Spirituality/Religion tradition is a resource for coping at this time.   []         Not able to assess due to medical condition    Services Provided Today:  []         crisis intervention    []         reading Scriptures  [x]         spiritual assessment    []         prayer  [x]         empathic listening/emotional support  []         rites and rituals (cite in comments)  []         life review     []         Religion support  []         theological development   []         advocacy  []         ethical dialog     []         blessing  []         bereavement support    [x]         support to family  []         anticipatory grief support   []         help with AMD  []         spiritual guidance    []         meditation      Spiritual Care Needs  []         Emotional Support  []         Spiritual/Anabaptism Care  []         Loss/Adjustment  []         Advocacy/Referral                /Ethics  [x]         No needs expressed at               this time  []         Other: (note in               comments)  [address]  []         Follow up visits with               pt/family  []         Provide materials  []         Schedule sacraments  []         Contact Community               Clergy  [x]         Follow up as needed  []         Other: (note in               comments)     Comments:  requested for fam care in the ER. Son Tae Newsome and son's wife Ana Ritter were in the consult waiting room.  provided empathic listening and compassionate presence as they shared of past medical history of the pt and how they were feeling about her recent situation. They said that they are trying to be optimistic of the situation but realistic of what is going on. They shared that they are believers of the [place]. They are members of [employer]. They use diane as a coping resource at this time. No other family are in the immediate area except for Gianluca's son. Advised of  availability and assured them of continued emotional and spiritual support. John Sequeira M.S.   Spiritual Care Department  If need arise please call SWEETIE ([phone])

## 2017-02-15 NOTE — ED NOTES
Pt given discharge instructions and prescriptions by Dr. Christiane Wang . Pt able to verbalize understanding of these instructions at this time. No other questions. Pt wheeled out of ED at this time.

## 2017-02-15 NOTE — PROGRESS NOTES
[facility]  Pulmonary, Critical Care, and Sleep Medicine    Name: Hilda Weldon MRN: [id number]   : 1935 Hospital: [facility]   Date: 2/15/2017        Critical Care Initial Patient Consult    IMPRESSION:   · S/p Cardiopulmonary arrest; upon discharge from ER, Stopped breathing. Had no CPR for 5-10 minutes. · Acute Respiratory Failure, Chronic CO2 retention, Not compensated due to metabolic acidosis, On mechanical vent support; hx of copd. Has been on home oxygen. · Encephalopathy; given presentation pt is at very high risk of Anoxic brain injury. breathing over vent. Ct of head: results are pending. Could have status epilepticus. · Probably shock liver  · Anemia  · Hyperglycemia  · Lactic Acidosis  · Has been on Atrial fib, was on home eliquis. · Obese  · OMA on CPAP  · Echo from 16: Was normal.  · Qtc prolongation with qtc of 457  · PMH of hypertension  · Critically ill, 40 min CC, EOP. High risk of death and multiple organ failure  · Discussed with pts son, and Nurse this am.       RECOMMENDATIONS:   · Neuro eval; head Ct is pending result. · EEG  · Will get PICC line placed  · Monitor Lactate, Lytes, Cr and Hepatic panel  · Will get repeat Echo   · Avoid sedation at this point. · On empiric abx  · Insuling and glycemic monitoring  · Vent support, not ready for weaning due to Mental status. Subjective/History: This patient has been seen and evaluated at the request of Dr. Lay Mitchell for above. Patient is a 80 y.o. female has a home bipap. Her machine was not working and she presented for evaluation. IN ER had CPR per ACLS protocol. Medical Hx is noted. Reviewed the course in Er  Pt has been feeling worse over the last 3 days. Felt like bipap was not working, suspect she had worsening respiratory status.          Past Medical History   Diagnosis Date    CHF (congestive heart failure) (HCC)     [facility]     Edema     Hip fracture, left ([facility]     Hypertension     Morbid obesity with BMI of 40.0-44.9, adult ([facility]     OMA (obstructive sleep apnea)     Protrusio acetabuli      left      Past Surgical History   Procedure Laterality Date    Pr chest surgery procedure unlisted  7/2015     right lung collapse    Hx other surgical  2000     abdominal cyst removed    Hx orthopaedic       Kyphoplasty       Prior to Admission medications    Medication Sig Start Date End Date Taking? Authorizing Provider   predniSONE (DELTASONE) 20 mg tablet Take 3 Tabs by mouth daily for 4 days. 2/15/17 2/19/17  [facility]. Lulú Taylor MD   oxyCODONE IR (ROXICODONE) 5 mg immediate release tablet Take 1 Tab by mouth every four (4) hours as needed for Pain. Max Daily Amount: 30 mg. 10/15/16   Manda Coulter MD   [facility]) 5 mg tablet Take 5 mg by mouth two (2) times a day. Indications: PREVENT THROMBOEMBOLISM WITH CHRONIC ATRIAL FIBRILLATION    Historical Provider   pregabalin (LYRICA) 75 mg capsule Take 1 Cap by mouth three (3) times daily. Max Daily Amount: 225 mg. 6/30/15   Enoch Bond NP   bumetanide (BUMEX) 0.5 mg tablet Take 1 mg by mouth two (2) times a day. Historical Provider   potassium chloride SR (KLOR-CON 10) 10 mEq tablet Take 40 mEq by mouth daily. Historical Provider   fluticasone-vilanterol (BREO ELLIPTA) 100-25 mcg/dose inhaler Take 1 Puff by inhalation daily. Historical Provider   psyllium (METAMUCIL) packet Take 1 Packet by mouth daily as needed (constipation). Historical Provider   senna (SENNA) 8.6 mg tablet Take 2 Tabs by mouth nightly as needed for Constipation. Historical Provider   omeprazole (PRILOSEC) 20 mg capsule Take 20 mg by mouth Daily (before dinner). Historical Provider   magnesium hydroxide (TRINIDAD MILK OF MAGNESIA) 400 mg/5 mL suspension Take 30 mL by mouth daily as needed for Constipation.     Historical Provider   lisinopril (PRINIVIL, ZESTRIL) 20 mg tablet Take 20 mg by mouth daily. Ely Powell MD   acetaZOLAMIDE (DIAMOX) 125 mg tablet Take 125 mg by mouth daily. For glaucoma    Ely Powell MD   atorvastatin (LIPITOR) 40 mg tablet Take 40 mg by mouth nightly. Ely Powell MD   latanoprost (XALATAN) 0.005 % ophthalmic solution Administer 1 Drop to both eyes nightly. Ely Powell MD     Current Facility-Administered Medications   Medication Dose Route Frequency    sodium chloride (NS) flush 5-10 mL  5-10 mL IntraVENous Q8H    0.9% sodium chloride infusion  100 mL/hr IntraVENous CONTINUOUS    insulin lispro (HUMALOG) injection   SubCUTAneous AC&HS    pantoprazole (PROTONIX) 40 mg in sodium chloride 0.9 % 10 mL injection  40 mg IntraVENous DAILY    apixaban (ELIQUIS) tablet 5 mg  5 mg Oral BID    fluticasone-vilanterol (BREO ELLIPTA) 100mcg-25mcg/puff  1 Puff Inhalation DAILY    latanoprost (XALATAN) 0.005 % ophthalmic solution 1 Drop  1 Drop Both Eyes QHS    acetaZOLAMIDE (DIAMOX) tablet 125 mg  125 mg Oral DAILY    albuterol-ipratropium (DUO-NEB) 2.5 MG-0.5 MG/3 ML  3 mL Nebulization Q6H RT    levoFLOXacin (LEVAQUIN) 750 mg in D5W IVPB  750 mg IntraVENous Q48H    sodium chloride 0.9 % bolus infusion 1,000 mL  1,000 mL IntraVENous ONCE    aspirin chewable tablet 324 mg  324 mg Per NG tube NOW    chlorhexidine (PERIDEX) 0.12 % mouthwash 15 mL  15 mL Oral Q12H    mupirocin (BACTROBAN) 2 % ointment   Topical Q12H     Allergies   Allergen Reactions    Sulfa (Sulfonamide Antibiotics) Unknown (comments)      Social History   Substance Use Topics    Smoking status: Never Smoker    Smokeless tobacco: Not on file    Alcohol use No      Family History   Problem Relation Age of Onset    Liver Disease Brother         Review of Systems:  A comprehensive review of systems was negative.     Objective:   Vital Signs:    Visit Vitals    /87    Pulse 91    Resp 16    SpO2 100%               Temp (24hrs), Av °F (36.7 °C), Min:98 °F (36.7 °C), Max:98 °F (36.7 °C)       Intake/Output:   Last shift:         Last 3 shifts:    No intake or output data in the 24 hours ending 02/15/17 0955  Hemodynamics:   PAP:   CO:     Wedge:   CI:     CVP:    SVR:       PVR:       Ventilator Settings:  Mode Rate Tidal Volume Pressure FiO2 PEEP   Assist control   500 ml    100 % 5 cm H20     Peak airway pressure: 52 cm H2O    Minute ventilation: 7.9 l/min      Physical Exam:    General:  Intubated, not following any commands, eyes were deviated to right, upwards. appears stated age. Head:  Normocephalic, without obvious abnormality, atraumatic. Eyes:  Conjunctivae/corneas clear. PERRL, EOMs intact. Nose: Nares normal. Septum midline. Mucosa normal. No drainage or sinus tenderness. Throat: Lips, mucosa, and tongue normal. Teeth and gums normal.   Neck: Supple, symmetrical, trachea midline, no adenopathy, thyroid: no enlargment/tenderness/nodules, no carotid bruit and no JVD. Back:   Symmetric, no curvature. ROM normal.   Lungs:   Clear to auscultation bilaterally. Chest wall:  No tenderness or deformity. Heart:  Regular rate and rhythm, S1, S2 normal, no murmur, click, rub or gallop. Abdomen:   Soft, non-tender. Bowel sounds normal. No masses,  No organomegaly. Extremities: Extremities normal, atraumatic, no cyanosis or edema. Pulses: 2+ and symmetric all extremities. Skin: Skin color, texture, turgor normal. No rashes or lesions   Lymph nodes: Cervical, supraclavicular, and axillary nodes normal.   Neurologic: Not able to assess.         Data:     Recent Results (from the past 24 hour(s))   EKG, 12 LEAD, INITIAL    Collection Time: 02/14/17  5:48 PM   Result Value Ref Range    Ventricular Rate 109 BPM    Atrial Rate 109 BPM    P-R Interval 166 ms    QRS Duration 100 ms    Q-T Interval 340 ms    QTC Calculation (Bezet) 457 ms    Calculated P Axis 49 degrees    Calculated R Axis -51 degrees    Calculated T Axis 37 degrees    Diagnosis       Sinus tachycardia  Left anterior fascicular block  Possible Anterior infarct (cited on or before 14-FEB-2017)  Abnormal ECG  When compared with ECG of 03-FEB-2017 03:54,  Vent. rate has increased BY  36 BPM     CBC WITH AUTOMATED DIFF    Collection Time: 02/14/17  9:02 PM   Result Value Ref Range    WBC 13.1 (H) 3.6 - 11.0 K/uL    RBC 4.10 3.80 - 5.20 M/uL    HGB 11.2 (L) 11.5 - 16.0 g/dL    HCT 35.1 35.0 - 47.0 %    MCV 85.6 80.0 - 99.0 FL    MCH 27.3 26.0 - 34.0 PG    MCHC 31.9 30.0 - 36.5 g/dL    RDW 16.5 (H) 11.5 - 14.5 %    PLATELET 240 551 - 623 K/uL    NEUTROPHILS 84 (H) 32 - 75 %    LYMPHOCYTES 10 (L) 12 - 49 %    MONOCYTES 6 5 - 13 %    EOSINOPHILS 0 0 - 7 %    BASOPHILS 0 0 - 1 %    ABS. NEUTROPHILS 11.1 (H) 1.8 - 8.0 K/UL    ABS. LYMPHOCYTES 1.3 0.8 - 3.5 K/UL    ABS. MONOCYTES 0.8 0.0 - 1.0 K/UL    ABS. EOSINOPHILS 0.0 0.0 - 0.4 K/UL    ABS. BASOPHILS 0.0 0.0 - 0.1 K/UL   METABOLIC PANEL, COMPREHENSIVE    Collection Time: 02/14/17  9:02 PM   Result Value Ref Range    Sodium 141 136 - 145 mmol/L    Potassium 3.7 3.5 - 5.1 mmol/L    Chloride 99 97 - 108 mmol/L    CO2 33 (H) 21 - 32 mmol/L    Anion gap 9 5 - 15 mmol/L    Glucose 162 (H) 65 - 100 mg/dL    BUN 20 6 - 20 MG/DL    Creatinine 0.90 0.55 - 1.02 MG/DL    BUN/Creatinine ratio 22 (H) 12 - 20      GFR est AA >60 >60 ml/min/1.73m2    GFR est non-AA >60 >60 ml/min/1.73m2    Calcium 8.9 8.5 - 10.1 MG/DL    Bilirubin, total 0.2 0.2 - 1.0 MG/DL    ALT (SGPT) 27 12 - 78 U/L    AST (SGOT) 23 15 - 37 U/L    Alk.  phosphatase 105 45 - 117 U/L    Protein, total 8.0 6.4 - 8.2 g/dL    Albumin 3.5 3.5 - 5.0 g/dL    Globulin 4.5 (H) 2.0 - 4.0 g/dL    A-G Ratio 0.8 (L) 1.1 - 2.2     CK W/ REFLX CKMB    Collection Time: 02/14/17  9:02 PM   Result Value Ref Range     (H) 26 - 192 U/L   TROPONIN I    Collection Time: 02/14/17  9:02 PM   Result Value Ref Range    Troponin-I, Qt. <0.04 <0.05 ng/mL   MAGNESIUM    Collection Time: 02/14/17  9:02 PM   Result Value Ref Range Magnesium 1.7 1.6 - 2.4 mg/dL   CK-MB,QUANT. Collection Time: 02/14/17  9:02 PM   Result Value Ref Range    CK - MB 4.0 (H) <3.6 NG/ML    CK-MB Index 1.8 0 - 2.5     CBC WITH AUTOMATED DIFF    Collection Time: 02/15/17  7:13 AM   Result Value Ref Range    WBC 15.2 (H) 3.6 - 11.0 K/uL    RBC 4.08 3.80 - 5.20 M/uL    HGB 11.1 (L) 11.5 - 16.0 g/dL    HCT 36.0 35.0 - 47.0 %    MCV 88.2 80.0 - 99.0 FL    MCH 27.2 26.0 - 34.0 PG    MCHC 30.8 30.0 - 36.5 g/dL    RDW 16.5 (H) 11.5 - 14.5 %    PLATELET 285 526 - 144 K/uL    NEUTROPHILS 75 %    BAND NEUTROPHILS 2 %    LYMPHOCYTES 19 %    MONOCYTES 0 %    EOSINOPHILS 0 %    BASOPHILS 0 %    METAMYELOCYTES 4 %    ABS. NEUTROPHILS 11.7 K/UL    ABS. LYMPHOCYTES 2.9 K/UL    ABS. MONOCYTES 0.0 K/UL    ABS. EOSINOPHILS 0.0 K/UL    ABS. BASOPHILS 0.0 K/UL    RBC COMMENTS ANISOCYTOSIS  1+        DF MANUAL     METABOLIC PANEL, COMPREHENSIVE    Collection Time: 02/15/17  7:13 AM   Result Value Ref Range    Sodium 143 136 - 145 mmol/L    Potassium 4.4 3.5 - 5.1 mmol/L    Chloride 104 97 - 108 mmol/L    CO2 22 21 - 32 mmol/L    Anion gap 17 (H) 5 - 15 mmol/L    Glucose 281 (H) 65 - 100 mg/dL    BUN 20 6 - 20 MG/DL    Creatinine 1.12 (H) 0.55 - 1.02 MG/DL    BUN/Creatinine ratio 18 12 - 20      GFR est AA 57 (L) >60 ml/min/1.73m2    GFR est non-AA 47 (L) >60 ml/min/1.73m2    Calcium 7.9 (L) 8.5 - 10.1 MG/DL    Bilirubin, total 0.3 0.2 - 1.0 MG/DL    ALT (SGPT) 100 (H) 12 - 78 U/L    AST (SGOT) 144 (H) 15 - 37 U/L    Alk.  phosphatase 121 (H) 45 - 117 U/L    Protein, total 7.4 6.4 - 8.2 g/dL    Albumin 2.9 (L) 3.5 - 5.0 g/dL    Globulin 4.5 (H) 2.0 - 4.0 g/dL    A-G Ratio 0.6 (L) 1.1 - 2.2     CK W/ REFLX CKMB    Collection Time: 02/15/17  7:13 AM   Result Value Ref Range     (H) 26 - 192 U/L   TROPONIN I    Collection Time: 02/15/17  7:13 AM   Result Value Ref Range    Troponin-I, Qt. 0.04 <0.05 ng/mL   LACTIC ACID, PLASMA    Collection Time: 02/15/17  7:13 AM   Result Value Ref Range    Lactic acid 8.2 (HH) 0.4 - 2.0 MMOL/L   CK-MB,QUANT. Collection Time: 02/15/17  7:13 AM   Result Value Ref Range    CK - MB 3.8 (H) <3.6 NG/ML    CK-MB Index 1.7 0 - 2.5     BLOOD GAS, ARTERIAL    Collection Time: 02/15/17  7:17 AM   Result Value Ref Range    pH 7.21 (LL) 7.35 - 7.45      PCO2 59 (H) 35.0 - 45.0 mmHg    PO2 235 (H) 80 - 100 mmHg    O2 SAT 99 (H) 92 - 97 %    BICARBONATE 23 22 - 26 mmol/L    BASE DEFICIT 5.8 mmol/L    O2 METHOD VENTILATOR      FIO2 100 %    MODE A/C      Tidal volume 500      SET RATE 16      EPAP/CPAP/PEEP 5.0      Sample source ARTERIAL      SITE RIGHT RADIAL      GIL'S TEST YES      Critical value read back Luisito Gastelum MD              Telemetry:normal sinus rhythm    Imaging:  I have personally reviewed the patients radiographs and have reviewed the reports:  2-15-17:          Total critical care time exclusive of procedures: 40 minutes  Jeremy Mcneal MD

## 2017-02-15 NOTE — PROGRESS NOTES
Spiritual Care Assessment/Progress Notes    Sydnee Albrecht [id number]  [id number]    1935  80 y.o.  female    Patient Telephone Number: [phone] (home)   Buddhist Affiliation: [facility]   Language: English   Extended Emergency Contact Information  Primary Emergency Contact: Gianluca Otoole  Address: [address]  [address] Phone: [phone]  Mobile Phone: [phone]  Relation: Son  Secondary Emergency Contact: Jeanette Dutta 83  Mobile Phone: [phone]  Relation: Other Relative   Patient Active Problem List    Diagnosis Date Noted    Chronic obstructive pulmonary disease ([facility] 07/22/2016    Chronic congestive heart failure ([facility] 07/22/2016    Acute renal failure (ARF) ([facility] 12/23/2015    Compression fracture 10/24/2015    Acute on chronic respiratory failure with hypercapnia ([facility] 06/26/2015    Acute respiratory failure ([facility] 05/31/2015        Date: 2/15/2017       Level of Buddhist/Spiritual Activity:  []         Involved in diane tradition/spiritual practice    []         Not involved in diane tradition/spiritual practice  []         Spiritually oriented    []         Claims no spiritual orientation    []         seeking spiritual identity  []         Feels alienated from Rastafari practice/tradition  []         Feels angry about Rastafari practice/tradition  []         Spirituality/Rastafari tradition a resource for coping at this time.   [x]         Not able to assess due to medical condition    Services Provided Today:  []         crisis intervention    []         reading Scriptures  []         spiritual assessment    []         prayer  []         empathic listening/emotional support  []         rites and rituals (cite in comments)  []         life review     []         Rastafari support  []         theological development   []         advocacy  []         ethical dialog     []         blessing  [] bereavement support    []         support to family  []         anticipatory grief support   []         help with AMD  []         spiritual guidance    []         meditation      Spiritual Care Needs  []         Emotional Support  []         Spiritual/Shinto Care  []         Loss/Adjustment  []         Advocacy/Referral                /Ethics  []         No needs expressed at               this time  [x]         Other: (note in               comments)  [address]  []         Follow up visits with               pt/family  []         Provide materials  []         Schedule sacraments  []         Contact Community               Clergy  []         Follow up as needed  [x]         Other: (note in               comments)     Comments:  support requested in the ER by staff for pt who was in cardiac arrest. When  arrived pt was intubated and seemed unresponsive. Consulted with RN and ER registration. Per ER registration, Pt had a son who was here but had left saying that he would be back after he picks up his wife.  will follow-up when able/ needed for further support. Harpal Coburn., M.S.   Spiritual Care Department  If need arise please call SWEETIE ([phone])

## 2017-02-15 NOTE — ED NOTES
Bedside and Verbal shift change report given to BHARTI Rosario (oncoming nurse) by Vicie Collet (offgoing nurse). Report included the following information SBAR, ED Summary, Intake/Output, MAR and Recent Results.

## 2017-02-16 PROBLEM — R06.02 SOB (SHORTNESS OF BREATH): Status: ACTIVE | Noted: 2017-01-01

## 2017-02-16 NOTE — PROGRESS NOTES
20:00  Pt biting ETT  Bleeding from mouth. Can see ETT bent.  Peak pressure 65. Unable to pass suction catheter. Ativan and Fentanyl IV given. Pt with constant jerking like motions. Eyes rolled back in head. No gag, no cough and no blink. 20:20 Bite block placed by RT.      20:50 Called Dr Flakito Pfeiffer regarding seizure like activity and tongue laceration. Discuss poor management with Fentanyl and Ativan only. Propofol ordered for seizure protection. 2200 Decreased seizure like activity noted. 05:00 Slow trickle bloody secretions from mouth. Attempted mouth care but bleeding increased. No signs of seizures on 25 mcg Propofol.

## 2017-02-16 NOTE — PROGRESS NOTES
Attended interdisciplinary rounds in CCU where patient care was discussed.   ENDY Mccollum, [facility], [address] [place] [address] [phone]     Paging Service  [phone]

## 2017-02-16 NOTE — CONSULTS
Palliative Medicine Consult  Mehul: [phone] ([phone])    Patient Name: Alva Vicente  YOB: 1935    Date of Initial Consult: 2/16/17  Reason for Consult: care decisions  Requesting Provider: Jamari Hilton   Primary Care Physician: Ely Powell MD      SUMMARY:   Alva Vicente is a 80 y.o. with a past history of CHF, COPD, and asthma on 2L NC at baseline , who was admitted on 2/15/2017 from home with a diagnosis of cardiopulmonary arrest. Current medical issues leading to Palliative Medicine involvement include: stopped breathing 10 min away from the hospital, arrived PEA, most likely anoxic brain injury. PALLIATIVE DIAGNOSES:   1. Cough  2. SOB  3. Chest pain   4. PEA  5. Acute respiratory failure  6. Encephalopathy/anoxic brain injury  7. Anemia   8. OMA with cpap     PLAN:   1. Met with pt's daughter Stefany Donnelly); long discussion about the events prior to this admission, explained concerns for anoxic brain injury, explained what that meant, provided supportive listening as she talked about pt and her relationship with pt. Brothers are coming, plan is to meet with neurologist Sat to discuss goals of care. We talked about compassionate withdrawal.   2. Initial consult note routed to primary continuity provider  3.  Communicated plan of care with: Palliative IDT, RN, ICU IDT       GOALS OF CARE / TREATMENT PREFERENCES:   [====Goals of Care====]  GOALS OF CARE:  Patient / health care proxy stated goals:     None stated      TREATMENT PREFERENCES:   Code Status: Full Code    Advance Care Planning:  Advance Care Planning 12/27/2015   Patient's Healthcare Decision Maker is: Verbal statement (Legal Next of Kin remains as decision maker)   Primary Decision Maker Name Casey Ruiz   Primary Decision Maker Phone Number [phone]   Primary Decision Maker Relationship to Patient Adult child   Secondary Decision Maker Name Chaparrita Alex   Secondary Decision Maker Phone Number [phone]   Secondary Decision Maker Relationship to Patient Adult child   Confirm Advance Directive -   Patient Would Like to Complete Advance Directive -   Does the patient have other document types -       Other:    The palliative care team has discussed with patient / health care proxy about goals of care / treatment preferences for patient.  [====Goals of Care====]         HISTORY:     History obtained from: chart    CHIEF COMPLAINT:  SOB    HPI/SUBJECTIVE:    The patient is:   [] Verbal and participatory  [x] Non-participatory due to: intubated  Presented to ED at 9pm on 2/4/17 with c/o gradually worsening, constant SOB that started that am with associated productive cough with yellow/white sputum. In addition sne transient episode of non-radiating CP that same day that started when she was walking that lasted 30 min and resolved on its own. Pt sleeps with cpap however, this am upon waking realized her machine was broken. The supply company is bringing a new machine to her the next day, however, pt is concerned about what to do tonight so she called her pulmonologist who told her to come to the ED for possible admission overnight. She has given herself 5 nebulizer treatments today without relief, and is taking prednisone. Pt remained in the ED until early am on 2/5/17, when it was determined pt could go home. On the way home pt stopped breathing (son driving) and son brought her back (10 min drive back) and pt was found to be in PEA. Pt was resuscitated, but with seizures, and probable anoxic brain injury.    Clinical Pain Assessment (nonverbal scale for severity on nonverbal patients):   [++++ Clinical Pain Assessment++++]  [++++Pain Severity++++]: Pain: 0  [++++Pain Character++++]:   [++++Pain Duration++++]:   [++++Pain Effect++++]:   [++++Pain Factors++++]:   [++++Pain Frequency++++]:   [++++Pain Location++++]:   [++++ Clinical Pain Assessment++++]     FUNCTIONAL ASSESSMENT:     Palliative Performance Scale (PPS):  PPS: 10       PSYCHOSOCIAL/SPIRITUAL SCREENING:     Advance Care Planning:  Advance Care Planning 12/27/2015   Patient's Healthcare Decision Maker is: Verbal statement (Legal Next of Kin remains as decision maker)   Primary Decision Maker Name Magdy Lockhart   Primary Decision Maker Phone Number [phone]   Primary Decision Maker Relationship to Patient Adult child   Secondary Decision Maker Name Alberto Nuñez   Secondary Decision Maker Phone Number [phone]   Secondary Decision Maker Relationship to Patient Adult child   Confirm Advance Directive -   Patient Would Like to Complete Advance Directive -   Does the patient have other document types -        Any spiritual / Yazdanism concerns:  [] Yes /  [x] No    Caregiver Burnout:  [] Yes /  [] No /  [x] No Caregiver Present      Anticipatory grief assessment:   [x] Normal  / [] Maladaptive       ESAS Anxiety: Anxiety: 0    ESAS Depression:   pt intubated       REVIEW OF SYSTEMS:     Positive and pertinent negative findings in ROS are noted above in HPI. The following systems were [] reviewed / [x] unable to be reviewed as noted in HPI  Other findings are noted below. Systems: constitutional, ears/nose/mouth/throat, respiratory, gastrointestinal, genitourinary, musculoskeletal, integumentary, neurologic, psychiatric, endocrine. Positive findings noted below. Modified ESAS Completed by: provider           Pain: 0   Anxiety: 0 Nausea: 0   Anorexia: 0 Dyspnea: 0     Constipation: No              PHYSICAL EXAM:     From RN flowsheet:  Wt Readings from Last 3 Encounters:   02/16/17 214 lb 8.1 oz (97.3 kg)   02/14/17 212 lb 4.9 oz (96.3 kg)   02/03/17 223 lb 1.7 oz (101.2 kg)     Blood pressure 153/63, pulse 74, temperature (!) 100.7 °F (38.2 °C), resp. rate 17, weight 214 lb 8.1 oz (97.3 kg), SpO2 100 %.     Pain Scale 1: Adult Nonverbal Pain Scale  Pain Intensity 1: 3                 Last bowel movement, if known:     Constitutional: obese, intubated, sedated  Eyes: pupils equal, anicteric, right upper fixed gaze  ENMT: no nasal discharge, moist mucous membranes  Cardiovascular: regular rhythm, distal pulses intact  Respiratory: breathing not labored, symmetric, vented  Gastrointestinal: soft non-tender, +bowel sounds  Musculoskeletal: no deformity, no tenderness to palpation  Skin: warm, dry  Neurologic: not following commands,not moving all extremities  Psychiatric:unable to assess due to pt factors     HISTORY:     Principal Problem:    Respiratory arrest ([facility] [address] (2/15/2017)    Active Problems:    Acute on chronic respiratory failure with hypercapnia ([facility] [address] (6/26/2015)      Anoxic brain injury ([facility] [address] (2/15/2017)      Past Medical History   Diagnosis Date    CHF (congestive heart failure) (HCC)     Chronic obstructive pulmonary disease (HCC)     Edema     Hip fracture, left (HCC)     Hypertension     Morbid obesity with BMI of 40.0-44.9, adult ([facility] [address]     OMA (obstructive sleep apnea)     Protrusio acetabuli      left      Past Surgical History   Procedure Laterality Date    Pr chest surgery procedure unlisted  7/2015     right lung collapse    Hx other surgical  2000     abdominal cyst removed    Hx orthopaedic       Kyphoplasty       Family History   Problem Relation Age of Onset    Liver Disease Brother       History reviewed, no pertinent family history.   Social History   Substance Use Topics    Smoking status: Never Smoker    Smokeless tobacco: Not on file    Alcohol use No     Allergies   Allergen Reactions    Sulfa (Sulfonamide Antibiotics) Unknown (comments)      Current Facility-Administered Medications   Medication Dose Route Frequency    sodium chloride (NS) flush 5-10 mL  5-10 mL IntraVENous Q8H    sodium chloride (NS) flush 5-10 mL  5-10 mL IntraVENous PRN    0.9% sodium chloride infusion  50 mL/hr IntraVENous CONTINUOUS    acetaminophen (TYLENOL) tablet 650 mg  650 mg Oral Q6H PRN    ondansetron (ZOFRAN) injection 4 mg  4 mg IntraVENous Q6H PRN  insulin lispro (HUMALOG) injection   SubCUTAneous AC&HS    glucose chewable tablet 16 g  4 Tab Oral PRN    dextrose (D50W) injection syrg 12.5-25 g  12.5-25 g IntraVENous PRN    glucagon (GLUCAGEN) injection 1 mg  1 mg IntraMUSCular PRN    LORazepam (ATIVAN) injection 2 mg  2 mg IntraVENous PRN    pantoprazole (PROTONIX) 40 mg in sodium chloride 0.9 % 10 mL injection  40 mg IntraVENous DAILY    fluticasone-vilanterol (BREO ELLIPTA) 100mcg-25mcg/puff  1 Puff Inhalation DAILY    latanoprost (XALATAN) 0.005 % ophthalmic solution 1 Drop  1 Drop Both Eyes QHS    albuterol-ipratropium (DUO-NEB) 2.5 MG-0.5 MG/3 ML  3 mL Nebulization Q6H RT    levoFLOXacin (LEVAQUIN) 750 mg in D5W IVPB  750 mg IntraVENous Q48H    chlorhexidine (PERIDEX) 0.12 % mouthwash 15 mL  15 mL Oral Q12H    mupirocin (BACTROBAN) 2 % ointment   Topical Q12H    sodium chloride (NS) flush 10-30 mL  10-30 mL InterCATHeter PRN    sodium chloride (NS) flush 10 mL  10 mL InterCATHeter Q24H    sodium chloride (NS) flush 10 mL  10 mL InterCATHeter PRN    sodium chloride (NS) flush 10-40 mL  10-40 mL InterCATHeter Q8H    sodium chloride (NS) flush 20 mL  20 mL InterCATHeter Q24H    heparin (porcine) pf 300 Units  300 Units InterCATHeter PRN    fentaNYL citrate (PF) injection 25-50 mcg  25-50 mcg IntraVENous Q4H PRN    levETIRAcetam (KEPPRA) 1,000 mg in 0.9% sodium chloride IVPB  1,000 mg IntraVENous Q12H    enalaprilat (VASOTEC) injection 1.25 mg  1.25 mg IntraVENous Q6H    metoprolol (LOPRESSOR) injection 5 mg  5 mg IntraVENous Q6H PRN    propofol (DIPRIVAN) infusion  5-50 mcg/kg/min IntraVENous TITRATE          LAB AND IMAGING FINDINGS:     Lab Results   Component Value Date/Time    WBC 15.2 02/16/2017 03:42 AM    HGB 9.3 02/16/2017 03:42 AM    PLATELET 068 23/79/9567 03:42 AM     Lab Results   Component Value Date/Time    Sodium 145 02/16/2017 03:42 AM    Potassium 3.1 02/16/2017 03:42 AM    Chloride 107 02/16/2017 03:42 AM    CO2 29 02/16/2017 03:42 AM    BUN 26 02/16/2017 03:42 AM    Creatinine 0.88 02/16/2017 03:42 AM    Calcium 7.8 02/16/2017 03:42 AM    Magnesium 2.0 02/16/2017 03:42 AM    Phosphorus 2.2 02/16/2017 03:42 AM      Lab Results   Component Value Date/Time    AST (SGOT) 69 02/16/2017 03:42 AM    Alk. phosphatase 79 02/16/2017 03:42 AM    Protein, total 5.9 02/16/2017 03:42 AM    Albumin 2.5 02/16/2017 03:42 AM    Globulin 3.4 02/16/2017 03:42 AM     Lab Results   Component Value Date/Time    INR 1.2 02/16/2017 03:42 AM    Prothrombin time 12.0 02/16/2017 03:42 AM    aPTT 26.8 02/16/2017 03:42 AM      No results found for: IRON, FE, TIBC, IBCT, PSAT, FERR   Lab Results   Component Value Date/Time    pH 7.21 02/15/2017 07:17 AM    PCO2 59 02/15/2017 07:17 AM    PO2 235 02/15/2017 07:17 AM     No components found for: [place]   Lab Results   Component Value Date/Time     02/16/2017 03:42 AM    CK - MB 2.0 02/16/2017 03:42 AM                Total time: 90 min  Counseling / coordination time: 80 min  > 50% counseling / coordination?: yes    Prolonged service was provided for  [x]30 min   []75 min in face to face time in the presence of the patient. Time Start: 3:15pm  Time End: 4:45pm  Note: this can only be billed with  (initial) or 21 [phone] (follow up). If multiple start / stop times, list each separately.

## 2017-02-16 NOTE — PROGRESS NOTES
[facility]  Pulmonary, Critical Care, and Sleep Medicine    Name: Sanju Breen MRN: [id number]   : 1935 Hospital: [facility]   Date: 2017        Critical Care Initial Patient Consult    IMPRESSION:   · S/p Cardiopulmonary arrest; upon discharge from ER, Stopped breathing. Had no CPR for 5-10 minutes. · Acute Respiratory Failure, Chronic CO2 retention, Not compensated due to metabolic acidosis, On mechanical vent support; hx of copd. Has been on home oxygen. · Appears to have myoclonic activity, possible seizures  · She bit her tongue last night. · Encephalopathy; given presentation pt is at very high risk of Anoxic brain injury. · Probably shock liver  · Anemia  · Hyperglycemia  · Lactic Acidosis  · Has been on Atrial fib, was on home eliquis. · Obese  · OMA on CPAP at home. · Echo from 16: Was normal.  · Qtc prolongation with qtc of 457  · PMH of hypertension  · Critically ill, 35 min CC, EOP. High risk of death and multiple organ failure  · Discussed with pts son, and Nurse this am.   · Prognosis is looking poor. RECOMMENDATIONS:   · Neuro eval  · EEG to be done this am.   · PICC line in place. · Monitor Lactate, Lytes, Cr and Hepatic panel-will repeat test later this pm.   · On sedation as needed, can be weaned off to get EEG. · On empiric abx  · Insuling and glycemic monitoring  · Vent support, not ready for weaning due to Mental status. · Will ask palliative care to assist in her care. Subjective/History:   17: Noted to have myoclonus overnight. Possible seizures, she bit her tongue and appears to have a  Tongue laceration. No purposeful activity. This patient has been seen and evaluated at the request of Dr. Garrett Calvert for above. Patient is a 80 y.o. female has a home bipap. Her machine was not working and she presented for evaluation. IN ER had CPR per ACLS protocol. Medical Hx is noted.    Reviewed the course in Er  Pt has been feeling worse over the last 3 days. Felt like bipap was not working, suspect she had worsening respiratory status. Past Medical History   Diagnosis Date    CHF (congestive heart failure) (HCC)     Chronic obstructive pulmonary disease (HCC)     Edema     Hip fracture, left (HCC)     Hypertension     Morbid obesity with BMI of 40.0-44.9, adult ([facility] [address]     OMA (obstructive sleep apnea)     Protrusio acetabuli      left      Past Surgical History   Procedure Laterality Date    Pr chest surgery procedure unlisted  7/2015     right lung collapse    Hx other surgical  2000     abdominal cyst removed    Hx orthopaedic       Kyphoplasty       Prior to Admission medications    Medication Sig Start Date End Date Taking? Authorizing Provider   bumetanide (BUMEX) 1 mg tablet Take 1 mg by mouth two (2) times a day. Yes Historical Provider   ferrous sulfate 325 mg (65 mg iron) tablet Take 325 mg by mouth Daily (before breakfast). Yes Historical Provider   montelukast (SINGULAIR) 10 mg tablet Take 10 mg by mouth daily. Yes Historical Provider   potassium chloride (KLOR-CON M20) 20 mEq tablet Take 20 mEq by mouth daily. Yes Historical Provider   albuterol (PROVENTIL) 5 mg/mL nebulizer solution 2.5 mg by Nebulization route once. 1 mL three times daily prn   Yes Historical Provider   latanoprost (XALATAN) 0.005 % ophthalmic solution Administer 1 Drop to both eyes nightly. Yes Historical Provider   tiZANidine (ZANAFLEX) 2 mg tablet Take 2 mg by mouth as needed. Indications: MUSCLE SPASM   Yes Historical Provider   traMADol (ULTRAM) 50 mg tablet Take 50 mg by mouth every twelve (12) hours as needed for Pain. Yes Historical Provider   apixaban (ELIQUIS) 5 mg tablet Take 5 mg by mouth daily. Indications: PREVENT THROMBOEMBOLISM IN CHRONIC ATRIAL FIBRILLATION    Historical Provider   pregabalin (LYRICA) 75 mg capsule Take 1 Cap by mouth three (3) times daily.  Max Daily Amount: 225 mg.  Patient taking differently: Take 150 mg by mouth three (3) times daily. 6/30/15   Nahid Tidwell NP   fluticasone-vilanterol (BREO ELLIPTA) 100-25 mcg/dose inhaler Take 1 Puff by inhalation daily. Historical Provider   psyllium (METAMUCIL) packet Take 1 Packet by mouth daily as needed (constipation). Historical Provider   senna (SENNA) 8.6 mg tablet Take 2 Tabs by mouth nightly as needed for Constipation. Historical Provider   omeprazole (PRILOSEC) 20 mg capsule Take 20 mg by mouth Daily (before breakfast). Historical Provider   magnesium hydroxide (3C Plus MILK OF MAGNESIA) 400 mg/5 mL suspension Take 30 mL by mouth daily as needed for Constipation. Historical Provider   lisinopril (PRINIVIL, ZESTRIL) 20 mg tablet Take 20 mg by mouth daily. Ely Powell MD   atorvastatin (LIPITOR) 40 mg tablet Take 40 mg by mouth nightly.     Ely Powell MD     Current Facility-Administered Medications   Medication Dose Route Frequency    potassium phosphate 20 mmol in dextrose 5% 250 mL infusion   IntraVENous ONCE    sodium chloride (NS) flush 5-10 mL  5-10 mL IntraVENous Q8H    0.9% sodium chloride infusion  100 mL/hr IntraVENous CONTINUOUS    insulin lispro (HUMALOG) injection   SubCUTAneous AC&HS    pantoprazole (PROTONIX) 40 mg in sodium chloride 0.9 % 10 mL injection  40 mg IntraVENous DAILY    fluticasone-vilanterol (BREO ELLIPTA) 100mcg-25mcg/puff  1 Puff Inhalation DAILY    latanoprost (XALATAN) 0.005 % ophthalmic solution 1 Drop  1 Drop Both Eyes QHS    albuterol-ipratropium (DUO-NEB) 2.5 MG-0.5 MG/3 ML  3 mL Nebulization Q6H RT    levoFLOXacin (LEVAQUIN) 750 mg in D5W IVPB  750 mg IntraVENous Q48H    chlorhexidine (PERIDEX) 0.12 % mouthwash 15 mL  15 mL Oral Q12H    mupirocin (BACTROBAN) 2 % ointment   Topical Q12H    sodium chloride (NS) flush 10 mL  10 mL InterCATHeter Q24H    sodium chloride (NS) flush 10-40 mL  10-40 mL InterCATHeter Q8H    sodium chloride (NS) flush 20 mL  20 mL InterCATHeter Q24H    levETIRAcetam (KEPPRA) 1,000 mg in 0.9% sodium chloride IVPB  1,000 mg IntraVENous Q12H    enalaprilat (VASOTEC) injection 1.25 mg  1.25 mg IntraVENous Q6H    propofol (DIPRIVAN) infusion  5-50 mcg/kg/min IntraVENous TITRATE     Allergies   Allergen Reactions    Sulfa (Sulfonamide Antibiotics) Unknown (comments)      Social History   Substance Use Topics    Smoking status: Never Smoker    Smokeless tobacco: Not on file    Alcohol use No      Family History   Problem Relation Age of Onset    Liver Disease Brother         Review of Systems:  A comprehensive review of systems was negative. Objective:   Vital Signs:    Visit Vitals    /65    Pulse 81    Temp 98.8 °F (37.1 °C)    Resp 16    Wt 100.8 kg (222 lb 3.6 oz)    SpO2 100%    BMI 39.37 kg/m2       O2 Device: Endotracheal tube, Ventilator       Temp (24hrs), Av.3 °F (36.8 °C), Min:97.5 °F (36.4 °C), Max:98.9 °F (37.2 °C)       Intake/Output:   Last shift:         Last 3 shifts:  1901 -  0700  In: 2184.2 [I.V.:1974.2]  Out: 1010 [Urine:1010]    Intake/Output Summary (Last 24 hours) at 17 5051  Last data filed at 17 0400   Gross per 24 hour   Intake          2184.18 ml   Output             1010 ml   Net          1174.18 ml     Hemodynamics:   PAP:   CO:     Wedge:   CI:     CVP:    SVR:       PVR:       Ventilator Settings:  Mode Rate Tidal Volume Pressure FiO2 PEEP   Assist control   500 ml    50 % 5 cm H20     Peak airway pressure: 32 cm H2O    Minute ventilation: 8.36 l/min      Physical Exam:    General:  Intubated and sedate, has blood from her tongue,  appears stated age. Head:  Normocephalic, without obvious abnormality, atraumatic. Eyes:  Conjunctivae/corneas clear. PERRL, EOMs intact. Nose: Nares normal. Septum midline. Mucosa normal. No drainage or sinus tenderness.    Throat: Lips, mucosa, and tongue normal. Teeth and gums normal.   Neck: Supple, symmetrical, trachea midline, no adenopathy, thyroid: no enlargment/tenderness/nodules, no carotid bruit and no JVD. Back:   Symmetric, no curvature. ROM normal.   Lungs:   Clear to auscultation bilaterally. Chest wall:  No tenderness or deformity. Heart:  Regular rate and rhythm, S1, S2 normal, no murmur, click, rub or gallop. Abdomen:   Soft, non-tender. Bowel sounds normal. No masses,  No organomegaly. Extremities: Extremities normal, atraumatic, no cyanosis or edema. Pulses: 2+ and symmetric all extremities. Skin: Skin color, texture, turgor normal. No rashes or lesions   Lymph nodes: Cervical, supraclavicular, and axillary nodes normal.   Neurologic: Not following any commands. Eyes seem deviated. Breathing over vent.         Data:     Recent Results (from the past 24 hour(s))   EKG, 12 LEAD, INITIAL    Collection Time: 02/15/17 10:51 AM   Result Value Ref Range    Ventricular Rate 70 BPM    Atrial Rate 70 BPM    P-R Interval 160 ms    QRS Duration 96 ms    Q-T Interval 412 ms    QTC Calculation (Bezet) 444 ms    Calculated P Axis 48 degrees    Calculated R Axis -26 degrees    Calculated T Axis 6 degrees    Diagnosis       Normal sinus rhythm  Low voltage QRS  Poor Anterior Forces    Confirmed by Gerson Massey ([id number]) on 2/15/2017 5:44:27 PM     GLUCOSE, POC    Collection Time: 02/15/17 11:06 AM   Result Value Ref Range    Glucose (POC) 216 (H) 65 - 100 mg/dL    Performed by [id number], RESPIRATORY/SPUTUM/BRONCH Yesi Bracket STAIN    Collection Time: 02/15/17  1:00 PM   Result Value Ref Range    Special Requests: NO SPECIAL REQUESTS      GRAM STAIN RARE  WBCS SEEN        GRAM STAIN FEW  EPITHELIAL CELLS SEEN        GRAM STAIN FEW  BUDDING YEAST        GRAM STAIN RARE  HYPHAL ELEMENTS SEEN        Culture result: PENDING    GLUCOSE, POC    Collection Time: 02/15/17  5:08 PM   Result Value Ref Range    Glucose (POC) 134 (H) 65 - 100 mg/dL    Performed by [facility], POC    Collection Time: 02/15/17 10:42 PM Result Value Ref Range    Glucose (POC) 146 (H) 65 - 100 mg/dL    Performed by Calvin Hutchinson    CBC WITH AUTOMATED DIFF    Collection Time: 02/16/17  3:42 AM   Result Value Ref Range    WBC 15.2 (H) 3.6 - 11.0 K/uL    RBC 3.40 (L) 3.80 - 5.20 M/uL    HGB 9.3 (L) 11.5 - 16.0 g/dL    HCT 28.4 (L) 35.0 - 47.0 %    MCV 83.5 80.0 - 99.0 FL    MCH 27.4 26.0 - 34.0 PG    MCHC 32.7 30.0 - 36.5 g/dL    RDW 16.3 (H) 11.5 - 14.5 %    PLATELET 040 958 - 994 K/uL    NEUTROPHILS 81 (H) 32 - 75 %    LYMPHOCYTES 9 (L) 12 - 49 %    MONOCYTES 10 5 - 13 %    EOSINOPHILS 0 0 - 7 %    BASOPHILS 0 0 - 1 %    ABS. NEUTROPHILS 12.4 (H) 1.8 - 8.0 K/UL    ABS. LYMPHOCYTES 1.3 0.8 - 3.5 K/UL    ABS. MONOCYTES 1.5 (H) 0.0 - 1.0 K/UL    ABS. EOSINOPHILS 0.0 0.0 - 0.4 K/UL    ABS. BASOPHILS 0.0 0.0 - 0.1 K/UL   MAGNESIUM    Collection Time: 02/16/17  3:42 AM   Result Value Ref Range    Magnesium 2.0 1.6 - 2.4 mg/dL   PHOSPHORUS    Collection Time: 02/16/17  3:42 AM   Result Value Ref Range    Phosphorus 2.2 (L) 2.6 - 4.7 MG/DL   TROPONIN I    Collection Time: 02/16/17  3:42 AM   Result Value Ref Range    Troponin-I, Qt. 0.40 (H) <0.05 ng/mL   CK W/ CKMB & INDEX    Collection Time: 02/16/17  3:42 AM   Result Value Ref Range     26 - 192 U/L    CK - MB 2.0 <3.6 NG/ML    CK-MB Index 1.2 0 - 2.5     METABOLIC PANEL, COMPREHENSIVE    Collection Time: 02/16/17  3:42 AM   Result Value Ref Range    Sodium 145 136 - 145 mmol/L    Potassium 3.1 (L) 3.5 - 5.1 mmol/L    Chloride 107 97 - 108 mmol/L    CO2 29 21 - 32 mmol/L    Anion gap 9 5 - 15 mmol/L    Glucose 116 (H) 65 - 100 mg/dL    BUN 26 (H) 6 - 20 MG/DL    Creatinine 0.88 0.55 - 1.02 MG/DL    BUN/Creatinine ratio 30 (H) 12 - 20      GFR est AA >60 >60 ml/min/1.73m2    GFR est non-AA >60 >60 ml/min/1.73m2    Calcium 7.8 (L) 8.5 - 10.1 MG/DL    Bilirubin, total 0.6 0.2 - 1.0 MG/DL    ALT (SGPT) 73 12 - 78 U/L    AST (SGOT) 69 (H) 15 - 37 U/L    Alk.  phosphatase 79 45 - 117 U/L    Protein, total 5.9 (L) 6.4 - 8.2 g/dL    Albumin 2.5 (L) 3.5 - 5.0 g/dL    Globulin 3.4 2.0 - 4.0 g/dL    A-G Ratio 0.7 (L) 1.1 - 2.2     PROTHROMBIN TIME + INR    Collection Time: 02/16/17  3:42 AM   Result Value Ref Range    INR 1.2 (H) 0.9 - 1.1      Prothrombin time 12.0 (H) 9.0 - 11.1 sec   PTT    Collection Time: 02/16/17  3:42 AM   Result Value Ref Range    aPTT 26.8 22.1 - 32.5 sec    aPTT, therapeutic range     58.0 - 77.0 SECS             Telemetry:normal sinus rhythm    Imaging:  I have personally reviewed the patients radiographs and have reviewed the reports:  2-15-17: ET tube, CVC in place. Mild IE on right greater than left.          Total critical care time exclusive of procedures: 35 minutes  Ken Butts MD

## 2017-02-16 NOTE — PROGRESS NOTES
Pressure Ulcer Prevention In basket Alert Received for Puma < 14 (moderate risk).      Suggested Care Plan/Interventions for Nursing  1. Complete Puma Pressure Ulcer Risk Scale and use sub scores to identify appropriate interventions. 2. Perform Assessment: skin, changes in LOC, visual cues for pain, monitor skin under medical devices  3. Respond to Reduced Sensory Perception: changes in LOC, check visual cues for pain, float heels, suspension boots, pressure redistribution bed/mattress/chair cushion, turning and reposition approximately every 2 hours (pillows & wedges), pad between skin to skin, turn & reposition  4. Manage Moisture: absorbent under pads, internal / external urinary device, internal /  external fecal device, minimize layers, contain wound drainage, access need for specialty bed, limit adult briefs, maintain skin hydration (lotion/cream), moisture barrier, offer toileting every hour  5. Promote Activity: increase time out of bed, chair cushion, PT/OT evaluation, trapeze to reposition, pressure redistribution bed/mattress/chair  6. Address Reduced Mobility: float heels / suspension boot, HOB 30 degrees or less, pressure redistribution bed/mattress/cushion, PT / OT evaluation, turn and reposition approximately every 2 hours (pillows & wedges)  7. Promote Nutrition: document food / fluid / supplement intake, encourage/assist with meals as needed  8. Reduce Friction and Shear: transferring/repositioning devices (lift/draw sheet), lift team/ patient mobility team, feet elevated on foot rest, minimize layers, foam dressing / transparent film / skin sealants, protective barrier creams and emollients, transfer aides (board, Gertrude lift, ceiling lift, stand assist), HOB 30 degrees or less, trapeze to reposition.   Wound Care Team

## 2017-02-16 NOTE — CARDIO/PULMONARY
C/P REHAB:  Chart reviewed. Admitted after cardiopulmonary failure in car after being discharged from ER, had CPR, intubated and admitted to ICU; suspected anoxic brain injury. Prognosis looking poor. History significant for CHF, HTN, OMA, COPD, and obesity. LVEF 55-60% on echo 9/9/16. Nonsmoker. Palliative care being consulted for care decisions. Cardiac teaching not appropriate.

## 2017-02-16 NOTE — PROGRESS NOTES
SBT not attempted this morning as pt did not pass the SAT screening due to seizure activity. Will continue to monitor.

## 2017-02-16 NOTE — PROGRESS NOTES
Uneventful shift, vital signs wnl, EEG done this am, maxwell inserted for retention. D5 added to NS per Dr. Nova Young for decreasing BG.

## 2017-02-16 NOTE — PROGRESS NOTES
Hospitalist Progress Note    NAME: Sanju Breen   :  1935   MRN:  [id number]       Interim Hospital Summary: 80 y.o. female whom presented on 2/15/2017 with      Assessment & Plan:  Respiratory arrest followed by PEA cardiac arrest POA  OMA on CPAP  Severe lactic acidosis 8.2, due to hypoperfusion during arrest  Elevated LFTs, suspect early shock liver from cardiac arrest  Hyperglycemia POA, likely due to steroid and stress  Leukocytosis POA likely stress and steroids related   Suspected anoxic brain injury due to cardiac arrest  P. afib -- on eliquis  Chronic diastolic chf EF 47-22%   HTN  Possible seizures related to anoxic brain injury  Pt managed in ICU, intubated  IVF, on empiric abx  Prognosis seems grim at this time  Started anti seizure meds by neurology  Awaiting EEG  Pulmonary involved in patient's care  I had a long discussion with daughter about her medical condition and prognosis  Daughter willing to see how she does for next 48 hours before making any further decision    Code: full  DVT prophylaxis: apixaban  Surrogate decision maker: Children      Subjective: Pt seen and examined at bedside. Intubated, unresponsive. Overnight events d/w RN   CHIEF COMPLAINT: f/u \"Respiratory arrest\"       Review of Systems:  Symptom Y/N Comments  Symptom Y/N Comments   Fever/Chills    Chest Pain     Poor Appetite    Edema     Cough    Abdominal Pain     Sputum    Joint Pain     SOB/ROMERO    Pruritis/Rash     Nausea/vomit    Tolerating PT/OT     Diarrhea    Tolerating Diet     Constipation    Other       Could NOT obtain due to: intubated     Objective:     VITALS:   Last 24hrs VS reviewed since prior progress note.  Most recent are:  Patient Vitals for the past 24 hrs:   Temp Pulse Resp BP SpO2   17 1600 100.3 °F (37.9 °C) 71 16 163/69 100 %   17 1543 - 72 16 - 100 %   17 1500 - 75 16 162/74 100 %   17 1320 - - - - 100 %   17 1300 - 74 17 153/63 100 %   17 1200 (!) 100.7 °F (38.2 °C) 78 19 153/69 100 %   02/16/17 1100 - 75 16 144/64 100 %   02/16/17 1023 - 77 16 - 100 %   02/16/17 1000 - 77 16 141/62 100 %   02/16/17 0900 - 82 16 138/65 100 %   02/16/17 0833 - - - - 100 %   02/16/17 0830 - 85 16 145/66 100 %   02/16/17 0800 (!) 100.8 °F (38.2 °C) 83 16 145/61 100 %   02/16/17 0700 - 81 16 136/65 100 %   02/16/17 0439 - 77 16 - (!) 1 %   02/16/17 0400 98.8 °F (37.1 °C) 77 16 127/59 100 %   02/16/17 0300 - 79 16 122/59 100 %   02/16/17 0233 - - - - 100 %   02/16/17 0200 - 84 16 131/66 100 %   02/16/17 0100 - 85 16 123/63 100 %   02/16/17 0034 - 82 16 - 100 %   02/15/17 2300 - 82 16 119/65 100 %   02/15/17 2200 - 81 22 145/70 100 %   02/15/17 2100 - 100 26 170/70 98 %   02/15/17 2023 - 89 16 - 100 %   02/15/17 2022 - - - - 100 %   02/15/17 2000 98.8 °F (37.1 °C) 92 16 122/68 99 %   02/15/17 1900 - 81 18 194/89 100 %   02/15/17 1800 - 77 16 181/84 100 %   02/15/17 1700 - 91 24 (!) 185/96 100 %   02/15/17 1630 - 74 18 168/90 100 %       Intake/Output Summary (Last 24 hours) at 02/16/17 1627  Last data filed at 02/16/17 1600   Gross per 24 hour   Intake          2528.36 ml   Output             1060 ml   Net          1468.36 ml        PHYSICAL EXAM:  General: WD, WN. Intubated, unespnsive    EENT:  EOMI. Anicteric sclerae. MMM  Resp:  CTA bilaterally, no wheezing or rales. No accessory muscle use  CV:  Regular  rhythm,  No edema  GI:  Soft, Non distended, Non tender.  +Bowel sounds  Neurologic:  Exam limited due to intubation status. Unreponsive   Psych:   Exam limited as pt unresponsive  Skin:  No rashes.   No jaundice    Reviewed most current lab test results and cultures  YES  Reviewed most current radiology test results   YES  Review and summation of old records today    NO  Reviewed patient's current orders and MAR    YES  PMH/ reviewed - no change compared to H&P  ________________________________________________________________________  Care Plan discussed with:    Comments Patient     Family  y Daughter at bedside   RN y    Care Manager     Consultant  y Pulmonary                     Multidiciplinary team rounds were held today with , nursing, pharmacist and clinical coordinator. Patient's plan of care was discussed; medications were reviewed and discharge planning was addressed. ________________________________________________________________________  Total NON critical care TIME:   Minutes    Total CRITICAL CARE TIME Spent: 36  Minutes non procedure based      Comments   >50% of visit spent in counseling and coordination of care x Discussion with family as above   ________________________________________________________________________  Raymond Pascal MD     Procedures: see electronic medical records for all procedures/Xrays and details which were not copied into this note but were reviewed prior to creation of Plan. LABS:  I reviewed today's most current labs and imaging studies.   Pertinent labs include:  Recent Labs      02/16/17   0342  02/15/17   0713  02/14/17   2102   WBC  15.2*  15.2*  13.1*   HGB  9.3*  11.1*  11.2*   HCT  28.4*  36.0  35.1   PLT  211  257  262     Recent Labs      02/16/17   0342  02/15/17   0713  02/14/17   2102   NA  145  143  141   K  3.1*  4.4  3.7   CL  107  104  99   CO2  29  22  33*   GLU  116*  281*  162*   BUN  26*  20  20   CREA  0.88  1.12*  0.90   CA  7.8*  7.9*  8.9   MG  2.0   --   1.7   PHOS  2.2*   --    --    ALB  2.5*  2.9*  3.5   TBILI  0.6  0.3  0.2   SGOT  69*  144*  23   ALT  73  100*  27   INR  1.2*   --    --        Signed: Raymond Pascal MD

## 2017-02-16 NOTE — INTERDISCIPLINARY ROUNDS
Interdisciplinary team rounds were held 2/16/17 with the following team members:Care Management, Diabetes Treatment Specialist, Nursing, Nutrition, Pastoral Care, Pharmacy, Physical Therapy, Physician and Clinical Coordinator. Plan of care discussed. Goal: See MD orders and progress notes for further  interventions and desired outcomes.

## 2017-02-17 PROBLEM — Z71.89 DO NOT RESUSCITATE DISCUSSION: Status: ACTIVE | Noted: 2017-01-01

## 2017-02-17 PROBLEM — Z71.89 COUNSELING REGARDING GOALS OF CARE: Status: ACTIVE | Noted: 2017-01-01

## 2017-02-17 NOTE — CARDIO/PULMONARY
C/P REHAB:  Chart reviewed. Admitted after cardiopulmonary failure in car after being discharged from ER, had CPR, intubated and admitted to ICU; suspected anoxic brain injury. Prognosis looking poor. History significant for CHF, HTN, OMA, COPD, and obesity. LVEF 55-60% on echo 9/9/16. Nonsmoker. EEG was \"done off sedation and showed no ictal activity. No purposeful reaction off sedation. \"  Family decided for DNR status and may decide for comfort care in 24-28 hours if no improvement. Cardiac teaching not appropriate.

## 2017-02-17 NOTE — PROGRESS NOTES
04: 18 Lab called to floor  With K level 2.7. Hospitalist paged. 04:58 Hospitalist return page with replacement orders. 05:00 Diprivan stopped for 7 minutes. Pt with head and eye twitching and at times biting ETT. Failed awakening trial.  Diprivan resumed at 25 mcg to stop twitching movements.

## 2017-02-17 NOTE — PROGRESS NOTES
Nutrition Assessment:    RECOMMENDATIONS:   Initiate TF via OGT:   Start Osmolite 1.2 @ 20mL/h, advance rate 10mL q 8h as tolerated to Goal Rate of 50mL/h + 75mL H2O flush q 6h (provides 1440kcals/67gPro/1284mL)    DIETITIANS INTERVENTIONS/PLAN:   Start TF  Monitor plan of care     ASSESSMENT:   Pt admitted with respiratory arrest.  PMH: HTN, CHF, COPD. Chart reviewed, case discussed during CCU rounds. Pt is intubated and sedated on propofol @ 15. 1mL/h which provides 399 kcals daily. OGT to suction with minimal OP. Discussed with karolyn Newman to start TF today. She is on IVF's. Labs reviewed, K+ 2.7 being repleted. No skin breakdown noted. See TF orders above. SUBJECTIVE/OBJECTIVE:   Pt intubated and sedated   Diet Order: NPO  % Eaten:  No data found. Pertinent Medications:cefepime, humalog, protonix, KCl; [email or web address]); Drips: propofol. Chemistries:  Lab Results   Component Value Date/Time    Sodium 144 02/17/2017 03:21 AM    Potassium 2.7 02/17/2017 03:21 AM    Chloride 109 02/17/2017 03:21 AM    CO2 26 02/17/2017 03:21 AM    Anion gap 9 02/17/2017 03:21 AM    Glucose 129 02/17/2017 03:21 AM    BUN 25 02/17/2017 03:21 AM    Creatinine 0.88 02/17/2017 03:21 AM    BUN/Creatinine ratio 28 02/17/2017 03:21 AM    GFR est AA >60 02/17/2017 03:21 AM    GFR est non-AA >60 02/17/2017 03:21 AM    Calcium 7.7 02/17/2017 03:21 AM    AST (SGOT) 80 02/17/2017 03:21 AM    Alk. phosphatase 72 02/17/2017 03:21 AM    Protein, total 5.7 02/17/2017 03:21 AM    Albumin 2.3 02/17/2017 03:21 AM    Globulin 3.4 02/17/2017 03:21 AM    A-G Ratio 0.7 02/17/2017 03:21 AM    ALT (SGPT) 50 02/17/2017 03:21 AM      Anthropometrics: Height:   Weight: 97.3 kg (214 lb 8.1 oz)    IBW (%IBW):   ( ) UBW (%UBW):   (  %)    BMI: Body mass index is 38 kg/(m^2).     This BMI is indicative of:  []Underweight   []Normal   []Overweight   [x] Obesity   [] Extreme Obesity (BMI>40)  Estimated Nutrition Needs (Based on): 0594 GKBTB/EBQ (PSU (MSJ 1405)) , 64 g (1gPro/kg ABW) Protein  Carbohydrate: At Least 130 g/day  Fluids: 1600 mL/day    Last BM: PTA   []Active     []Hyperactive  []Hypoactive       [] Absent   BS  Skin:    [x] Intact   [] Incision  [] Breakdown   [] DTI   [] Tears/Excoriation/Abrasion  [x]Edema(+1-BLE) [] Other: Wt Readings from Last 30 Encounters:   02/16/17 97.3 kg (214 lb 8.1 oz)   02/14/17 96.3 kg (212 lb 4.9 oz)   02/03/17 101.2 kg (223 lb 1.7 oz)   10/14/16 102 kg (224 lb 13.9 oz)   04/05/16 86.2 kg (190 lb)   12/27/15 102 kg (224 lb 13.9 oz)   12/23/15 96.2 kg (212 lb)   10/24/15 102.1 kg (225 lb)   09/27/15 108 kg (238 lb)   09/15/15 104.3 kg (230 lb)   09/02/15 108 kg (238 lb)   08/05/15 101.6 kg (224 lb)   07/23/15 97.1 kg (214 lb)   06/30/15 102.3 kg (225 lb 8 oz)   06/03/15 100.9 kg (222 lb 7.1 oz)   05/26/15 103.8 kg (228 lb 13.4 oz)      NUTRITION DIAGNOSES:   Problem:  Inadequate protein-energy intake      Etiology: related to pt NPO 2' vent     Signs/Symptoms: as evidenced by NPO + propofol meets <25% kcal and 0% protein needs. NUTRITION INTERVENTIONS:    Enteral/Parenteral Nutrition: Initiate enteral nutrition                GOAL:   Pt will tolerate initiation of TF with residuals <250mL in 2-4 days.      Cultural, Druze, or Ethnic Dietary Needs: None     LEARNING NEEDS (Diet, Food/Nutrient-Drug Interaction):    [x] None Identified   [] Identified and Education Provided/Documented   [] Identified and Pt declined/was not appropriate      [x] Interdisciplinary Care Plan Reviewed/Documented    [x] Participated in Discharge Planning: UTD    [x] Interdisciplinary Rounds     NUTRITION RISK:    [x] High              [] Moderate           []  Low  []  Minimal/Uncompromised    PT SEEN FOR:    [x]  MD Consult: []Calorie Count      []Diabetic Diet Education        []Diet Education     []Electrolyte Management     []General Nutrition Management and Supplements     [x]Management of Tube Feeding     []TPN Recommendations    []  RN Referral:  []MST score >=2     []Enteral/Parenteral Nutrition PTA     []Pregnant: Gestational DM or Multigestation   []  Low BMI  []  DTR Referral       Maribeth Elena RD  Pager [phone]  Weekend Pager [phone]

## 2017-02-17 NOTE — INTERDISCIPLINARY ROUNDS
Interdisciplinary team rounds were held 2/17/2017 with the following team members:Diabetes Treatment Specialist, Nursing, Nutrition, Pharmacy, Physical Therapy, Physician and Respiratory Therapy. Plan of care discussed, will start tube feeds. See clinical pathway and/or care plan for interventions and desired outcomes.

## 2017-02-17 NOTE — PROGRESS NOTES
Visited with Ms. Nbaabla French - no family or visitors present at this time. Reviewed pt's chart prior to visit. Nino Baptiste diane appears to be significant for pt and family. Offered words of comfort and shared prayer at bedside. Please page [phone] for  support as needed.     Jeanette Townsend, Palliative

## 2017-02-17 NOTE — PROGRESS NOTES
Palliative Medicine Consult  [place]: [phone] ([phone])    Patient Name: Kenneth Escobar  YOB: 1935    Date of Initial Consult: 2/16/17  Reason for Consult: care decisions  Requesting Provider: Del Taylor   Primary Care Physician: Ely Powell MD      SUMMARY:   Kenneth Escobar is a 80 y.o. with a past history of CHF, COPD, and asthma on 2L NC at baseline , who was admitted on 2/15/2017 from home with a diagnosis of cardiopulmonary arrest. Current medical issues leading to Palliative Medicine involvement include: stopped breathing 10 min away from the hospital, arrived PEA, most likely anoxic brain injury. Pt has 4 children; 3 boys and 1 girl. The daughter Cali Morales and son Alverto Krueger live locally. PALLIATIVE DIAGNOSES:   1. Cough  2. SOB  3. Chest pain   4. PEA  5. Acute respiratory failure  6. Encephalopathy/anoxic brain injury  7. Anemia   8. OMA with cpap     PLAN:   1. Met with Cali Morales and Alverto Krueger; long discussion about the events prior to this admission, explained concerns for anoxic brain injury, explained what that meant, discussed poor prognosis for meaningful recovery, counseled about compassionate extubation and comfort care. Recommended DNR. 2. After talking with their 2 brothers about goals of care, family decision was made to change code status to DNR. The brothers who live remotely are planning to come see pt prior to making decision to withdraw artificial support. 3. Initial consult note routed to primary continuity provider  4. Communicated plan of care with: Palliative IDT, RN, ICU IDT       GOALS OF CARE / TREATMENT PREFERENCES:   [====Goals of Care====]  GOALS OF CARE:  Patient / health care proxy stated goals:     1.  After 24-48 more hours might extubate      TREATMENT PREFERENCES:   Code Status: Partial Code    Advance Care Planning:  Advance Care Planning 12/27/2015   Patient's Healthcare Decision Maker is: Verbal statement (Legal Next of Kin remains as decision maker) Primary Decision Maker Name Isa Lewis   Primary Decision Maker Phone Number [phone]   Primary Decision Maker Relationship to Patient Adult child   Secondary Decision Maker Name Cynthia Sorenson   Secondary Decision Maker Phone Number [phone]   Secondary Decision Maker Relationship to Patient Adult child   Confirm Advance Directive -   Patient Would Like to Complete Advance Directive -   Does the patient have other document types -       Other:    The palliative care team has discussed with patient / health care proxy about goals of care / treatment preferences for patient.  [====Goals of Care====]         HISTORY:     History obtained from: chart    CHIEF COMPLAINT:  SOB    HPI/SUBJECTIVE:    The patient is:   [] Verbal and participatory  [x] Non-participatory due to: intubated  Presented to ED at 9pm on 2/4/17 with c/o gradually worsening, constant SOB that started that am with associated productive cough with yellow/white sputum. In addition sne transient episode of non-radiating CP that same day that started when she was walking that lasted 30 min and resolved on its own. Pt sleeps with cpap however, this am upon waking realized her machine was broken. The supply company is bringing a new machine to her the next day, however, pt is concerned about what to do tonight so she called her pulmonologist who told her to come to the ED for possible admission overnight. She has given herself 5 nebulizer treatments today without relief, and is taking prednisone. Pt remained in the ED until early am on 2/5/17, when it was determined pt could go home. On the way home pt stopped breathing (son driving) and son brought her back (10 min drive back) and pt was found to be in PEA. Pt was resuscitated, but with seizures, and probable anoxic brain injury. 2/17: Pt remains unresponsive and intubated.    Clinical Pain Assessment (nonverbal scale for severity on nonverbal patients):   [++++ Clinical Pain Assessment++++]  [++++Pain Severity++++]: Pain: 0  [++++Pain Character++++]:   [++++Pain Duration++++]:   [++++Pain Effect++++]:   [++++Pain Factors++++]:   [++++Pain Frequency++++]:   [++++Pain Location++++]:   [++++ Clinical Pain Assessment++++]     FUNCTIONAL ASSESSMENT:     Palliative Performance Scale (PPS):  PPS: 10       PSYCHOSOCIAL/SPIRITUAL SCREENING:     Advance Care Planning:  Advance Care Planning 12/27/2015   Patient's Healthcare Decision Maker is: Verbal statement (Legal Next of Kin remains as decision maker)   Primary Decision Maker Name Kali Esteves   Primary Decision Maker Phone Number [phone]   Primary Decision Maker Relationship to Patient Adult child   Secondary Decision Maker Name Alton Sy   Secondary Decision Maker Phone Number [phone]   Secondary Decision Maker Relationship to Patient Adult child   Confirm Advance Directive -   Patient Would Like to Complete Advance Directive -   Does the patient have other document types -        Any spiritual / Protestant concerns:  [] Yes /  [x] No    Caregiver Burnout:  [] Yes /  [] No /  [x] No Caregiver Present      Anticipatory grief assessment:   [x] Normal  / [] Maladaptive       ESAS Anxiety: Anxiety: 0    ESAS Depression:   pt intubated       REVIEW OF SYSTEMS:     Positive and pertinent negative findings in ROS are noted above in HPI. The following systems were [] reviewed / [x] unable to be reviewed as noted in HPI  Other findings are noted below. Systems: constitutional, ears/nose/mouth/throat, respiratory, gastrointestinal, genitourinary, musculoskeletal, integumentary, neurologic, psychiatric, endocrine. Positive findings noted below.   Modified ESAS Completed by: provider           Pain: 0   Anxiety: 0 Nausea: 0   Anorexia: 0 Dyspnea: 0     Constipation: No              PHYSICAL EXAM:     From RN flowsheet:  Wt Readings from Last 3 Encounters:   02/16/17 214 lb 8.1 oz (97.3 kg)   02/14/17 212 lb 4.9 oz (96.3 kg)   02/03/17 223 lb 1.7 oz (101.2 kg)     Blood pressure 156/75, pulse 64, temperature 98 °F (36.7 °C), resp. rate 16, weight 214 lb 8.1 oz (97.3 kg), SpO2 100 %. Pain Scale 1: Behavioral Pain Scale (BPS)  Pain Intensity 1: 3                 Last bowel movement, if known:     Constitutional: obese, intubated, sedated  Eyes: pupils equal, anicteric, right upper fixed gaze  ENMT: no nasal discharge, moist mucous membranes  Cardiovascular: regular rhythm, distal pulses intact  Respiratory: breathing not labored, symmetric, vented  Gastrointestinal: soft non-tender, +bowel sounds  Musculoskeletal: no deformity, no tenderness to palpation  Skin: warm, dry  Neurologic: not following commands,not moving all extremities  Psychiatric:unable to assess due to pt factors     HISTORY:     Principal Problem:    Respiratory arrest ([facility] [address] (2/15/2017)    Active Problems:    Acute on chronic respiratory failure with hypercapnia (HCC) (6/26/2015)      Anoxic brain injury ([facility] [address] (2/15/2017)      SOB (shortness of breath) (2/16/2017)      Past Medical History   Diagnosis Date    CHF (congestive heart failure) (HCC)     Chronic obstructive pulmonary disease (HCC)     Edema     Hip fracture, left (HCC)     Hypertension     Morbid obesity with BMI of 40.0-44.9, adult ([facility] [address]     OMA (obstructive sleep apnea)     Protrusio acetabuli      left      Past Surgical History   Procedure Laterality Date    Pr chest surgery procedure unlisted  7/2015     right lung collapse    Hx other surgical  2000     abdominal cyst removed    Hx orthopaedic       Kyphoplasty       Family History   Problem Relation Age of Onset    Liver Disease Brother       History reviewed, no pertinent family history.   Social History   Substance Use Topics    Smoking status: Never Smoker    Smokeless tobacco: Not on file    Alcohol use No     Allergies   Allergen Reactions    Sulfa (Sulfonamide Antibiotics) Unknown (comments)      Current Facility-Administered Medications Medication Dose Route Frequency    cefepime (MAXIPIME) 2 g in 0.9% sodium chloride (MBP/ADV) 100 mL  2 g IntraVENous Q12H    insulin lispro (HUMALOG) injection   SubCUTAneous Q6H    succinylcholine (ANECTINE) 20 mg/mL injection        propofol (DIPRIVAN) 10 mg/mL injection        dextrose 5% and 0.9% NaCl infusion  50 mL/hr IntraVENous CONTINUOUS    sodium chloride (NS) flush 5-10 mL  5-10 mL IntraVENous Q8H    sodium chloride (NS) flush 5-10 mL  5-10 mL IntraVENous PRN    acetaminophen (TYLENOL) tablet 650 mg  650 mg Oral Q6H PRN    ondansetron (ZOFRAN) injection 4 mg  4 mg IntraVENous Q6H PRN    glucose chewable tablet 16 g  4 Tab Oral PRN    dextrose (D50W) injection syrg 12.5-25 g  12.5-25 g IntraVENous PRN    glucagon (GLUCAGEN) injection 1 mg  1 mg IntraMUSCular PRN    LORazepam (ATIVAN) injection 2 mg  2 mg IntraVENous PRN    pantoprazole (PROTONIX) 40 mg in sodium chloride 0.9 % 10 mL injection  40 mg IntraVENous DAILY    latanoprost (XALATAN) 0.005 % ophthalmic solution 1 Drop  1 Drop Both Eyes QHS    albuterol-ipratropium (DUO-NEB) 2.5 MG-0.5 MG/3 ML  3 mL Nebulization Q6H RT    chlorhexidine (PERIDEX) 0.12 % mouthwash 15 mL  15 mL Oral Q12H    mupirocin (BACTROBAN) 2 % ointment   Topical Q12H    sodium chloride (NS) flush 10-30 mL  10-30 mL InterCATHeter PRN    sodium chloride (NS) flush 10 mL  10 mL InterCATHeter Q24H    sodium chloride (NS) flush 10 mL  10 mL InterCATHeter PRN    sodium chloride (NS) flush 10-40 mL  10-40 mL InterCATHeter Q8H    sodium chloride (NS) flush 20 mL  20 mL InterCATHeter Q24H    heparin (porcine) pf 300 Units  300 Units InterCATHeter PRN    fentaNYL citrate (PF) injection 25-50 mcg  25-50 mcg IntraVENous Q4H PRN    levETIRAcetam (KEPPRA) 1,000 mg in 0.9% sodium chloride IVPB  1,000 mg IntraVENous Q12H    enalaprilat (VASOTEC) injection 1.25 mg  1.25 mg IntraVENous Q6H    metoprolol (LOPRESSOR) injection 5 mg  5 mg IntraVENous Q6H PRN    propofol (DIPRIVAN) infusion  5-50 mcg/kg/min IntraVENous TITRATE          LAB AND IMAGING FINDINGS:     Lab Results   Component Value Date/Time    WBC 13.5 02/17/2017 03:21 AM    HGB 9.2 02/17/2017 03:21 AM    PLATELET 447 67/24/5143 03:21 AM     Lab Results   Component Value Date/Time    Sodium 144 02/17/2017 03:21 AM    Potassium 2.7 02/17/2017 03:21 AM    Chloride 109 02/17/2017 03:21 AM    CO2 26 02/17/2017 03:21 AM    BUN 25 02/17/2017 03:21 AM    Creatinine 0.88 02/17/2017 03:21 AM    Calcium 7.7 02/17/2017 03:21 AM    Magnesium 1.9 02/17/2017 03:21 AM    Phosphorus 2.4 02/17/2017 03:21 AM      Lab Results   Component Value Date/Time    AST (SGOT) 80 02/17/2017 03:21 AM    Alk. phosphatase 72 02/17/2017 03:21 AM    Protein, total 5.7 02/17/2017 03:21 AM    Albumin 2.3 02/17/2017 03:21 AM    Globulin 3.4 02/17/2017 03:21 AM     Lab Results   Component Value Date/Time    INR 1.2 02/16/2017 03:42 AM    Prothrombin time 12.0 02/16/2017 03:42 AM    aPTT 26.8 02/16/2017 03:42 AM      No results found for: IRON, FE, TIBC, IBCT, PSAT, FERR   Lab Results   Component Value Date/Time    pH 7.21 02/15/2017 07:17 AM    PCO2 59 02/15/2017 07:17 AM    PO2 235 02/15/2017 07:17 AM     No components found for: [place]   Lab Results   Component Value Date/Time     02/17/2017 03:21 AM    CK - MB <1.0 02/17/2017 03:21 AM                Total time: 45 min  Counseling / coordination time: 40 min  > 50% counseling / coordination?: yes    Prolonged service was provided for  []30 min   []75 min in face to face time in the presence of the patient. Time Start:   Time End:   Note: this can only be billed with 52632 (initial) or 21092 (follow up). If multiple start / stop times, list each separately.

## 2017-02-17 NOTE — PROGRESS NOTES
Pharmacy Automatic Renal Dosing Protocol - Antimicrobials    Indication for Antimicrobials: CAP  Current Regimen of Each Antimicrobial (Start Day & Day of Therapy):  Cefepime - pharmacy dosing (start , day 1)    Previous:  Levofloxacin 500 mg every 24 hours (started 2/15-)    Significant Cultures:   2/15: blood cx pending  2/15: Sputum cx: pending    Recent Labs      17   0321  17   0342  02/15/17   0713   CREA  0.88  0.88  1.12*   BUN  25*  26*  20   WBC  13.5*  15.2*  15.2*     Temp (24hrs), Av.6 °F (37.6 °C), Min:97.5 °F (36.4 °C), Max:100.8 °F (38.2 °C)    Creatinine Clearance (Creatinine Clearance (ml/min)): 42    Impression/Plan: cefepime 2 gm every 12 hours for crcl < 60 ml/min       Pharmacy will follow daily and adjust medications as appropriate for renal function and/or serum levels.     Thank you,  Brian Hurtado, PHARMD

## 2017-02-17 NOTE — PROGRESS NOTES
[facility]  Pulmonary, Critical Care, and Sleep Medicine    Name: Shelley Miles MRN: [id number]   : 1935 Hospital: [facility]   Date: 2017        Critical Care Patient Consult    IMPRESSION:   · S/p Cardiopulmonary arrest; upon discharge from ER, Stopped breathing. Had no CPR for 5-10 minutes. · Acute Respiratory Failure, Chronic CO2 retention, Not compensated due to metabolic acidosis, On mechanical vent support; hx of copd. Has been on home oxygen. · Encephalopathy; given presentation pt is at very high risk of Anoxic brain injury. Appears to have myoclonic activity, possible seizures  · Probably shock liver  · Anemia  · Hyperglycemia  · Lactic Acidosis  · Has been on Atrial fib, was on home eliquis. · Obese  · OMA on CPAP at home. · Echo from 16: Was normal.  · Qtc prolongation with qtc of 457  · PMH of hypertension  · Critically ill, 35 min CC, EOP. High risk of death and multiple organ failure  · Discussed with pts son, and Nurse this am.   · Prognosis is looking poor. RECOMMENDATIONS:   · Neuro eval  · PICC line in place. · On sedation as needed, can be weaned off to get EEG. But seems to having increased myoclonus. · On empiric abx, changed levoflox to cefepime due to risk of seizures with Levoflox. · Insuling and glycemic monitoring  · Vent support, not ready for weaning due to Mental status. · Will ask palliative care to assist in her care. · I spoke to pts daughter yesterday. Subjective/History:   17: No new issues. Remains with some bleeding from mouth. 17: Noted to have myoclonus overnight. Possible seizures, she bit her tongue and appears to have a  Tongue laceration. No purposeful activity. This patient has been seen and evaluated at the request of Dr. Roxie Snellen for above. Patient is a 80 y.o. female has a home bipap. Her machine was not working and she presented for evaluation.  IN ER had CPR per ACLS protocol. Medical Hx is noted. Reviewed the course in Er  Pt has been feeling worse over the last 3 days. Felt like bipap was not working, suspect she had worsening respiratory status. Past Medical History   Diagnosis Date    CHF (congestive heart failure) (HCC)     Chronic obstructive pulmonary disease (HCC)     Edema     Hip fracture, left (HCC)     Hypertension     Morbid obesity with BMI of 40.0-44.9, adult ([address]     OMA (obstructive sleep apnea)     Protrusio acetabuli      left      Past Surgical History   Procedure Laterality Date    Pr chest surgery procedure unlisted  7/2015     right lung collapse    Hx other surgical  2000     abdominal cyst removed    Hx orthopaedic       Kyphoplasty       Prior to Admission medications    Medication Sig Start Date End Date Taking? Authorizing Provider   bumetanide (BUMEX) 1 mg tablet Take 1 mg by mouth two (2) times a day. Yes Historical Provider   ferrous sulfate 325 mg (65 mg iron) tablet Take 325 mg by mouth Daily (before breakfast). Yes Historical Provider   montelukast (SINGULAIR) 10 mg tablet Take 10 mg by mouth daily. Yes Historical Provider   potassium chloride (KLOR-CON M20) 20 mEq tablet Take 20 mEq by mouth daily. Yes Historical Provider   albuterol (PROVENTIL) 5 mg/mL nebulizer solution 2.5 mg by Nebulization route once. 1 mL three times daily prn   Yes Historical Provider   latanoprost (XALATAN) 0.005 % ophthalmic solution Administer 1 Drop to both eyes nightly. Yes Historical Provider   tiZANidine (ZANAFLEX) 2 mg tablet Take 2 mg by mouth as needed. Indications: MUSCLE SPASM   Yes Historical Provider   traMADol (ULTRAM) 50 mg tablet Take 50 mg by mouth every twelve (12) hours as needed for Pain. Yes Historical Provider   apixaban (ELIQUIS) 5 mg tablet Take 5 mg by mouth daily.  Indications: PREVENT THROMBOEMBOLISM IN CHRONIC ATRIAL FIBRILLATION    Historical Provider   pregabalin (LYRICA) 75 mg capsule Take 1 Cap by mouth three (3) times daily. Max Daily Amount: 225 mg. Patient taking differently: Take 150 mg by mouth three (3) times daily. 6/30/15   Nidhi Nicole NP   fluticasone-vilanterol (BREO ELLIPTA) 100-25 mcg/dose inhaler Take 1 Puff by inhalation daily. Historical Provider   psyllium (METAMUCIL) packet Take 1 Packet by mouth daily as needed (constipation). Historical Provider   senna (SENNA) 8.6 mg tablet Take 2 Tabs by mouth nightly as needed for Constipation. Historical Provider   omeprazole (PRILOSEC) 20 mg capsule Take 20 mg by mouth Daily (before breakfast). Historical Provider   magnesium hydroxide ([employer] MILK OF MAGNESIA) 400 mg/5 mL suspension Take 30 mL by mouth daily as needed for Constipation. Historical Provider   lisinopril (PRINIVIL, ZESTRIL) 20 mg tablet Take 20 mg by mouth daily. Ely Powell MD   atorvastatin (LIPITOR) 40 mg tablet Take 40 mg by mouth nightly.     Ely Powell MD     Current Facility-Administered Medications   Medication Dose Route Frequency    potassium chloride 10 mEq in 100 ml IVPB  10 mEq IntraVENous Q1H    potassium chloride 20 mEq in 50 ml IVPB  20 mEq IntraVENous Q1H    cefepime (MAXIPIME) injection 1 g  1 g IntraVENous Q12H    dextrose 5% and 0.9% NaCl infusion  50 mL/hr IntraVENous CONTINUOUS    sodium chloride (NS) flush 5-10 mL  5-10 mL IntraVENous Q8H    insulin lispro (HUMALOG) injection   SubCUTAneous AC&HS    pantoprazole (PROTONIX) 40 mg in sodium chloride 0.9 % 10 mL injection  40 mg IntraVENous DAILY    fluticasone-vilanterol (BREO ELLIPTA) 100mcg-25mcg/puff  1 Puff Inhalation DAILY    latanoprost (XALATAN) 0.005 % ophthalmic solution 1 Drop  1 Drop Both Eyes QHS    albuterol-ipratropium (DUO-NEB) 2.5 MG-0.5 MG/3 ML  3 mL Nebulization Q6H RT    chlorhexidine (PERIDEX) 0.12 % mouthwash 15 mL  15 mL Oral Q12H    mupirocin (BACTROBAN) 2 % ointment   Topical Q12H    sodium chloride (NS) flush 10 mL  10 mL InterCATHeter Q24H    sodium chloride (NS) flush 10-40 mL  10-40 mL InterCATHeter Q8H    sodium chloride (NS) flush 20 mL  20 mL InterCATHeter Q24H    levETIRAcetam (KEPPRA) 1,000 mg in 0.9% sodium chloride IVPB  1,000 mg IntraVENous Q12H    enalaprilat (VASOTEC) injection 1.25 mg  1.25 mg IntraVENous Q6H    propofol (DIPRIVAN) infusion  5-50 mcg/kg/min IntraVENous TITRATE     Allergies   Allergen Reactions    Sulfa (Sulfonamide Antibiotics) Unknown (comments)      Social History   Substance Use Topics    Smoking status: Never Smoker    Smokeless tobacco: Not on file    Alcohol use No      Family History   Problem Relation Age of Onset    Liver Disease Brother         Review of Systems:  A comprehensive review of systems was negative. Objective:   Vital Signs:    Visit Vitals    /70 (BP 1 Location: Right arm, BP Patient Position: At rest)    Pulse 77    Temp 97.9 °F (36.6 °C)    Resp 16    Wt 97.3 kg (214 lb 8.1 oz)    SpO2 100%    BMI 38 kg/m2       O2 Device: Endotracheal tube       Temp (24hrs), Av.6 °F (37.6 °C), Min:97.5 °F (36.4 °C), Max:100.8 °F (38.2 °C)       Intake/Output:   Last shift:         Last 3 shifts: 02/15 1901 -  0700  In: 3503.9 [I.V.:3203.9]  Out: 1505 [Urine:1405]    Intake/Output Summary (Last 24 hours) at 17 0745  Last data filed at 17 0700   Gross per 24 hour   Intake          1963.01 ml   Output             1005 ml   Net           958.01 ml     Hemodynamics:   PAP:   CO:     Wedge:   CI:     CVP:    SVR:       PVR:       Ventilator Settings:  Mode Rate Tidal Volume Pressure FiO2 PEEP   Assist control   500 ml    50 % 5 cm H20     Peak airway pressure: 35 cm H2O    Minute ventilation: 8.46 l/min      Physical Exam:    General:  Intubated and sedate, has blood from her tongue,  appears stated age. Head:  Normocephalic, without obvious abnormality, atraumatic. Eyes:  Eyes are deviated upward. Nose: Nares normal. Septum midline.  Mucosa normal. No drainage or sinus tenderness. Throat: Lips, mucosa, and tongue normal. Teeth and gums normal.   Neck: Supple, symmetrical, trachea midline, no adenopathy, thyroid: no enlargment/tenderness/nodules, no carotid bruit and no JVD. Back:   Symmetric, no curvature. ROM normal.   Lungs:   Clear to auscultation bilaterally. Chest wall:  No tenderness or deformity. Heart:  Regular rate and rhythm, S1, S2 normal, no murmur, click, rub or gallop. Abdomen:   Soft, non-tender. Bowel sounds normal. No masses,  No organomegaly. Extremities: Extremities normal, atraumatic, no cyanosis or edema. Pulses: 2+ and symmetric all extremities. Skin: Skin color, texture, turgor normal. No rashes or lesions   Lymph nodes: Cervical, supraclavicular, and axillary nodes normal.   Neurologic: Not following any commands. Eyes seem deviated. Breathing over vent.         Data:     Recent Results (from the past 24 hour(s))   GLUCOSE, POC    Collection Time: 02/16/17  8:18 AM   Result Value Ref Range    Glucose (POC) 95 65 - 100 mg/dL    Performed by [employer], POC    Collection Time: 02/16/17 12:48 PM   Result Value Ref Range    Glucose (POC) 93 65 - 100 mg/dL    Performed by [employer], POC    Collection Time: 02/16/17  4:16 PM   Result Value Ref Range    Glucose (POC) 83 65 - 100 mg/dL    Performed by [employer], POC    Collection Time: 02/16/17 10:39 PM   Result Value Ref Range    Glucose (POC) 123 (H) 65 - 100 mg/dL    Performed by IDverge Jackson    CBC WITH AUTOMATED DIFF    Collection Time: 02/17/17  3:21 AM   Result Value Ref Range    WBC 13.5 (H) 3.6 - 11.0 K/uL    RBC 3.42 (L) 3.80 - 5.20 M/uL    HGB 9.2 (L) 11.5 - 16.0 g/dL    HCT 28.4 (L) 35.0 - 47.0 %    MCV 83.0 80.0 - 99.0 FL    MCH 26.9 26.0 - 34.0 PG    MCHC 32.4 30.0 - 36.5 g/dL    RDW 16.5 (H) 11.5 - 14.5 %    PLATELET 056 930 - 426 K/uL    NEUTROPHILS 77 (H) 32 - 75 %    LYMPHOCYTES 14 12 - 49 %    MONOCYTES 9 5 - 13 %    EOSINOPHILS 0 0 - 7 % BASOPHILS 0 0 - 1 %    ABS. NEUTROPHILS 10.5 (H) 1.8 - 8.0 K/UL    ABS. LYMPHOCYTES 1.8 0.8 - 3.5 K/UL    ABS. MONOCYTES 1.2 (H) 0.0 - 1.0 K/UL    ABS. EOSINOPHILS 0.0 0.0 - 0.4 K/UL    ABS. BASOPHILS 0.0 0.0 - 0.1 K/UL   METABOLIC PANEL, COMPREHENSIVE    Collection Time: 02/17/17  3:21 AM   Result Value Ref Range    Sodium 144 136 - 145 mmol/L    Potassium 2.7 (LL) 3.5 - 5.1 mmol/L    Chloride 109 (H) 97 - 108 mmol/L    CO2 26 21 - 32 mmol/L    Anion gap 9 5 - 15 mmol/L    Glucose 129 (H) 65 - 100 mg/dL    BUN 25 (H) 6 - 20 MG/DL    Creatinine 0.88 0.55 - 1.02 MG/DL    BUN/Creatinine ratio 28 (H) 12 - 20      GFR est AA >60 >60 ml/min/1.73m2    GFR est non-AA >60 >60 ml/min/1.73m2    Calcium 7.7 (L) 8.5 - 10.1 MG/DL    Bilirubin, total 0.7 0.2 - 1.0 MG/DL    ALT (SGPT) 50 12 - 78 U/L    AST (SGOT) 80 (H) 15 - 37 U/L    Alk. phosphatase 72 45 - 117 U/L    Protein, total 5.7 (L) 6.4 - 8.2 g/dL    Albumin 2.3 (L) 3.5 - 5.0 g/dL    Globulin 3.4 2.0 - 4.0 g/dL    A-G Ratio 0.7 (L) 1.1 - 2.2     MAGNESIUM    Collection Time: 02/17/17  3:21 AM   Result Value Ref Range    Magnesium 1.9 1.6 - 2.4 mg/dL   PHOSPHORUS    Collection Time: 02/17/17  3:21 AM   Result Value Ref Range    Phosphorus 2.4 (L) 2.6 - 4.7 MG/DL   TRIGLYCERIDE    Collection Time: 02/17/17  3:21 AM   Result Value Ref Range    Triglyceride 144 <150 MG/DL   CK-MB,QUANT. Collection Time: 02/17/17  3:21 AM   Result Value Ref Range    CK - MB <1.0 <3.6 NG/ML    CK-MB Index CANNOT BE CALCULATED 0 - 2.5     CK    Collection Time: 02/17/17  3:21 AM   Result Value Ref Range     26 - 192 U/L   TROPONIN I    Collection Time: 02/17/17  3:21 AM   Result Value Ref Range    Troponin-I, Qt. 0.11 (H) <0.05 ng/mL             Telemetry:normal sinus rhythm    Imaging:  I have personally reviewed the patients radiographs and have reviewed the reports:  2-15-17: ET tube, CVC in place. Mild IE on right greater than left. 2-17-17: ET tube in place.  Bibasilar volume loss. Mild to moderate IE.              Total critical care time exclusive of procedures: 35 minutes  Ken Butts MD

## 2017-02-17 NOTE — PROGRESS NOTES
Chief Complaint: cardiopulmonary failure    EEG chart reviewed. Case discussed with the family. Remains intubated and sedated. EEG was done off sedation and showed no ictal activity. No purposeful reaction off sedation. Physical exam:   Sedated intubated  No spontaneous movement. No withdrawal  Upward gaze  PERRL      Assesment and Plan    1. Cardiac arrest ([facility] [address]  Intubated and sedated    2. Anoxic brain injury ([facility] [address]  Poor prognosis    3.  Acute on chronic respiratory failure with hypercapnia (HCC)  Remains intubated         Reviewed data  Allergies  Sulfa (sulfonamide antibiotics)     Medications  Current Facility-Administered Medications   Medication Dose Route Frequency    levoFLOXacin (LEVAQUIN) 500 mg in D5W IVPB  500 mg IntraVENous Q24H    dextrose 5% and 0.9% NaCl infusion  50 mL/hr IntraVENous CONTINUOUS    sodium chloride (NS) flush 5-10 mL  5-10 mL IntraVENous Q8H    sodium chloride (NS) flush 5-10 mL  5-10 mL IntraVENous PRN    acetaminophen (TYLENOL) tablet 650 mg  650 mg Oral Q6H PRN    ondansetron (ZOFRAN) injection 4 mg  4 mg IntraVENous Q6H PRN    insulin lispro (HUMALOG) injection   SubCUTAneous AC&HS    glucose chewable tablet 16 g  4 Tab Oral PRN    dextrose (D50W) injection syrg 12.5-25 g  12.5-25 g IntraVENous PRN    glucagon (GLUCAGEN) injection 1 mg  1 mg IntraMUSCular PRN    LORazepam (ATIVAN) injection 2 mg  2 mg IntraVENous PRN    pantoprazole (PROTONIX) 40 mg in sodium chloride 0.9 % 10 mL injection  40 mg IntraVENous DAILY    fluticasone-vilanterol (BREO ELLIPTA) 100mcg-25mcg/puff  1 Puff Inhalation DAILY    latanoprost (XALATAN) 0.005 % ophthalmic solution 1 Drop  1 Drop Both Eyes QHS    albuterol-ipratropium (DUO-NEB) 2.5 MG-0.5 MG/3 ML  3 mL Nebulization Q6H RT    chlorhexidine (PERIDEX) 0.12 % mouthwash 15 mL  15 mL Oral Q12H    mupirocin (BACTROBAN) 2 % ointment   Topical Q12H    sodium chloride (NS) flush 10-30 mL  10-30 mL InterCATHeter PRN    sodium chloride (NS) flush 10 mL  10 mL InterCATHeter Q24H    sodium chloride (NS) flush 10 mL  10 mL InterCATHeter PRN    sodium chloride (NS) flush 10-40 mL  10-40 mL InterCATHeter Q8H    sodium chloride (NS) flush 20 mL  20 mL InterCATHeter Q24H    heparin (porcine) pf 300 Units  300 Units InterCATHeter PRN    fentaNYL citrate (PF) injection 25-50 mcg  25-50 mcg IntraVENous Q4H PRN    levETIRAcetam (KEPPRA) 1,000 mg in 0.9% sodium chloride IVPB  1,000 mg IntraVENous Q12H    enalaprilat (VASOTEC) injection 1.25 mg  1.25 mg IntraVENous Q6H    metoprolol (LOPRESSOR) injection 5 mg  5 mg IntraVENous Q6H PRN    propofol (DIPRIVAN) infusion  5-50 mcg/kg/min IntraVENous TITRATE        Medical History  Past Medical History   Diagnosis Date    CHF (congestive heart failure) (HCC)     Chronic obstructive pulmonary disease (HCC)     Edema     Hip fracture, left (HCC)     Hypertension     Morbid obesity with BMI of 40.0-44.9, adult (HCC)     OMA (obstructive sleep apnea)     Protrusio acetabuli      left       Recent Results (from the past 24 hour(s))   GLUCOSE, POC    Collection Time: 02/15/17 10:42 PM   Result Value Ref Range    Glucose (POC) 146 (H) 65 - 100 mg/dL    Performed by Ninoska GOODMAN WITH AUTOMATED DIFF    Collection Time: 02/16/17  3:42 AM   Result Value Ref Range    WBC 15.2 (H) 3.6 - 11.0 K/uL    RBC 3.40 (L) 3.80 - 5.20 M/uL    HGB 9.3 (L) 11.5 - 16.0 g/dL    HCT 28.4 (L) 35.0 - 47.0 %    MCV 83.5 80.0 - 99.0 FL    MCH 27.4 26.0 - 34.0 PG    MCHC 32.7 30.0 - 36.5 g/dL    RDW 16.3 (H) 11.5 - 14.5 %    PLATELET 658 419 - 553 K/uL    NEUTROPHILS 81 (H) 32 - 75 %    LYMPHOCYTES 9 (L) 12 - 49 %    MONOCYTES 10 5 - 13 %    EOSINOPHILS 0 0 - 7 %    BASOPHILS 0 0 - 1 %    ABS. NEUTROPHILS 12.4 (H) 1.8 - 8.0 K/UL    ABS. LYMPHOCYTES 1.3 0.8 - 3.5 K/UL    ABS. MONOCYTES 1.5 (H) 0.0 - 1.0 K/UL    ABS. EOSINOPHILS 0.0 0.0 - 0.4 K/UL    ABS.  BASOPHILS 0.0 0.0 - 0.1 K/UL   MAGNESIUM    Collection Time: 02/16/17  3:42 AM   Result Value Ref Range    Magnesium 2.0 1.6 - 2.4 mg/dL   PHOSPHORUS    Collection Time: 02/16/17  3:42 AM   Result Value Ref Range    Phosphorus 2.2 (L) 2.6 - 4.7 MG/DL   TROPONIN I    Collection Time: 02/16/17  3:42 AM   Result Value Ref Range    Troponin-I, Qt. 0.40 (H) <0.05 ng/mL   CK W/ CKMB & INDEX    Collection Time: 02/16/17  3:42 AM   Result Value Ref Range     26 - 192 U/L    CK - MB 2.0 <3.6 NG/ML    CK-MB Index 1.2 0 - 2.5     METABOLIC PANEL, COMPREHENSIVE    Collection Time: 02/16/17  3:42 AM   Result Value Ref Range    Sodium 145 136 - 145 mmol/L    Potassium 3.1 (L) 3.5 - 5.1 mmol/L    Chloride 107 97 - 108 mmol/L    CO2 29 21 - 32 mmol/L    Anion gap 9 5 - 15 mmol/L    Glucose 116 (H) 65 - 100 mg/dL    BUN 26 (H) 6 - 20 MG/DL    Creatinine 0.88 0.55 - 1.02 MG/DL    BUN/Creatinine ratio 30 (H) 12 - 20      GFR est AA >60 >60 ml/min/1.73m2    GFR est non-AA >60 >60 ml/min/1.73m2    Calcium 7.8 (L) 8.5 - 10.1 MG/DL    Bilirubin, total 0.6 0.2 - 1.0 MG/DL    ALT (SGPT) 73 12 - 78 U/L    AST (SGOT) 69 (H) 15 - 37 U/L    Alk.  phosphatase 79 45 - 117 U/L    Protein, total 5.9 (L) 6.4 - 8.2 g/dL    Albumin 2.5 (L) 3.5 - 5.0 g/dL    Globulin 3.4 2.0 - 4.0 g/dL    A-G Ratio 0.7 (L) 1.1 - 2.2     PROTHROMBIN TIME + INR    Collection Time: 02/16/17  3:42 AM   Result Value Ref Range    INR 1.2 (H) 0.9 - 1.1      Prothrombin time 12.0 (H) 9.0 - 11.1 sec   PTT    Collection Time: 02/16/17  3:42 AM   Result Value Ref Range    aPTT 26.8 22.1 - 32.5 sec    aPTT, therapeutic range     58.0 - 77.0 SECS   GLUCOSE, POC    Collection Time: 02/16/17  8:18 AM   Result Value Ref Range    Glucose (POC) 95 65 - 100 mg/dL    Performed by [employer], POC    Collection Time: 02/16/17 12:48 PM   Result Value Ref Range    Glucose (POC) 93 65 - 100 mg/dL    Performed by [employer], POC    Collection Time: 02/16/17  4:16 PM   Result Value Ref Range    Glucose (POC) 83 65 - 100 mg/dL Performed by Albesa Kawasaki      30 minutes critical care time

## 2017-02-17 NOTE — PROGRESS NOTES
Chief Complaint: cardiopulmonary failure    EEG chart reviewed. Case discussed with the family. Remains intubated and sedated. EEG was done off sedation and showed no ictal activity. No purposeful reaction off sedation. Now DNR  Physical exam:   Sedated intubated  No spontaneous movement. Withdraws feet  Fixed upward gaze  PERRL  No grimace  No dolls        Assesment and Plan    1. Cardiac arrest ([facility] [address]  Intubated and sedated    2. Anoxic brain injury ([address]  Poor prognosis    3.  Acute on chronic respiratory failure with hypercapnia (HCC)  Remains intubated         Reviewed data  Allergies  Sulfa (sulfonamide antibiotics)     Medications  Current Facility-Administered Medications   Medication Dose Route Frequency    cefepime (MAXIPIME) 2 g in 0.9% sodium chloride (MBP/ADV) 100 mL  2 g IntraVENous Q12H    insulin lispro (HUMALOG) injection   SubCUTAneous Q6H    succinylcholine (ANECTINE) 20 mg/mL injection        propofol (DIPRIVAN) 10 mg/mL injection        dextrose 5% and 0.9% NaCl infusion  50 mL/hr IntraVENous CONTINUOUS    sodium chloride (NS) flush 5-10 mL  5-10 mL IntraVENous Q8H    sodium chloride (NS) flush 5-10 mL  5-10 mL IntraVENous PRN    acetaminophen (TYLENOL) tablet 650 mg  650 mg Oral Q6H PRN    ondansetron (ZOFRAN) injection 4 mg  4 mg IntraVENous Q6H PRN    glucose chewable tablet 16 g  4 Tab Oral PRN    dextrose (D50W) injection syrg 12.5-25 g  12.5-25 g IntraVENous PRN    glucagon (GLUCAGEN) injection 1 mg  1 mg IntraMUSCular PRN    LORazepam (ATIVAN) injection 2 mg  2 mg IntraVENous PRN    pantoprazole (PROTONIX) 40 mg in sodium chloride 0.9 % 10 mL injection  40 mg IntraVENous DAILY    latanoprost (XALATAN) 0.005 % ophthalmic solution 1 Drop  1 Drop Both Eyes QHS    albuterol-ipratropium (DUO-NEB) 2.5 MG-0.5 MG/3 ML  3 mL Nebulization Q6H RT    chlorhexidine (PERIDEX) 0.12 % mouthwash 15 mL  15 mL Oral Q12H    mupirocin (BACTROBAN) 2 % ointment   Topical Q12H    heparin (porcine) pf 300 Units  300 Units InterCATHeter PRN    fentaNYL citrate (PF) injection 25-50 mcg  25-50 mcg IntraVENous Q4H PRN    levETIRAcetam (KEPPRA) 1,000 mg in 0.9% sodium chloride IVPB  1,000 mg IntraVENous Q12H    enalaprilat (VASOTEC) injection 1.25 mg  1.25 mg IntraVENous Q6H    metoprolol (LOPRESSOR) injection 5 mg  5 mg IntraVENous Q6H PRN    propofol (DIPRIVAN) infusion  5-50 mcg/kg/min IntraVENous TITRATE        Medical History  Past Medical History   Diagnosis Date    CHF (congestive heart failure) (HCC)     Chronic obstructive pulmonary disease (HCC)     Edema     Hip fracture, left (HCC)     Hypertension     Morbid obesity with BMI of 40.0-44.9, adult (HCC)     OMA (obstructive sleep apnea)     Protrusio acetabuli      left       Recent Results (from the past 24 hour(s))   GLUCOSE, POC    Collection Time: 02/16/17 12:48 PM   Result Value Ref Range    Glucose (POC) 93 65 - 100 mg/dL    Performed by [employer], POC    Collection Time: 02/16/17  4:16 PM   Result Value Ref Range    Glucose (POC) 83 65 - 100 mg/dL    Performed by [employer], POC    Collection Time: 02/16/17 10:39 PM   Result Value Ref Range    Glucose (POC) 123 (H) 65 - 100 mg/dL    Performed by Anais Salinas    CBC WITH AUTOMATED DIFF    Collection Time: 02/17/17  3:21 AM   Result Value Ref Range    WBC 13.5 (H) 3.6 - 11.0 K/uL    RBC 3.42 (L) 3.80 - 5.20 M/uL    HGB 9.2 (L) 11.5 - 16.0 g/dL    HCT 28.4 (L) 35.0 - 47.0 %    MCV 83.0 80.0 - 99.0 FL    MCH 26.9 26.0 - 34.0 PG    MCHC 32.4 30.0 - 36.5 g/dL    RDW 16.5 (H) 11.5 - 14.5 %    PLATELET 083 798 - 919 K/uL    NEUTROPHILS 77 (H) 32 - 75 %    LYMPHOCYTES 14 12 - 49 %    MONOCYTES 9 5 - 13 %    EOSINOPHILS 0 0 - 7 %    BASOPHILS 0 0 - 1 %    ABS. NEUTROPHILS 10.5 (H) 1.8 - 8.0 K/UL    ABS. LYMPHOCYTES 1.8 0.8 - 3.5 K/UL    ABS. MONOCYTES 1.2 (H) 0.0 - 1.0 K/UL    ABS. EOSINOPHILS 0.0 0.0 - 0.4 K/UL    ABS.  BASOPHILS 0.0 0.0 - 0.1 K/UL   METABOLIC PANEL, COMPREHENSIVE    Collection Time: 02/17/17  3:21 AM   Result Value Ref Range    Sodium 144 136 - 145 mmol/L    Potassium 2.7 (LL) 3.5 - 5.1 mmol/L    Chloride 109 (H) 97 - 108 mmol/L    CO2 26 21 - 32 mmol/L    Anion gap 9 5 - 15 mmol/L    Glucose 129 (H) 65 - 100 mg/dL    BUN 25 (H) 6 - 20 MG/DL    Creatinine 0.88 0.55 - 1.02 MG/DL    BUN/Creatinine ratio 28 (H) 12 - 20      GFR est AA >60 >60 ml/min/1.73m2    GFR est non-AA >60 >60 ml/min/1.73m2    Calcium 7.7 (L) 8.5 - 10.1 MG/DL    Bilirubin, total 0.7 0.2 - 1.0 MG/DL    ALT (SGPT) 50 12 - 78 U/L    AST (SGOT) 80 (H) 15 - 37 U/L    Alk. phosphatase 72 45 - 117 U/L    Protein, total 5.7 (L) 6.4 - 8.2 g/dL    Albumin 2.3 (L) 3.5 - 5.0 g/dL    Globulin 3.4 2.0 - 4.0 g/dL    A-G Ratio 0.7 (L) 1.1 - 2.2     MAGNESIUM    Collection Time: 02/17/17  3:21 AM   Result Value Ref Range    Magnesium 1.9 1.6 - 2.4 mg/dL   PHOSPHORUS    Collection Time: 02/17/17  3:21 AM   Result Value Ref Range    Phosphorus 2.4 (L) 2.6 - 4.7 MG/DL   TRIGLYCERIDE    Collection Time: 02/17/17  3:21 AM   Result Value Ref Range    Triglyceride 144 <150 MG/DL   CK-MB,QUANT.     Collection Time: 02/17/17  3:21 AM   Result Value Ref Range    CK - MB <1.0 <3.6 NG/ML    CK-MB Index CANNOT BE CALCULATED 0 - 2.5     CK    Collection Time: 02/17/17  3:21 AM   Result Value Ref Range     26 - 192 U/L   TROPONIN I    Collection Time: 02/17/17  3:21 AM   Result Value Ref Range    Troponin-I, Qt. 0.11 (H) <0.05 ng/mL   GLUCOSE, POC    Collection Time: 02/17/17  8:11 AM   Result Value Ref Range    Glucose (POC) 118 (H) 65 - 100 mg/dL    Performed by [facility], POC    Collection Time: 02/17/17 10:59 AM   Result Value Ref Range    Glucose (POC) 131 (H) 65 - 100 mg/dL    Performed by Karie Malcolm

## 2017-02-17 NOTE — PROGRESS NOTES
[facility] bed comfortably. 1015: palliative in to speak with family regarding code status. 1100: pt reassessed no changes noted. Continues to have moderate amount of pink tinged oral secretions. 1500: Pt reassessed no changes noted. 1645: pt family voice wishes to withdrawal care at 2030 tonight. Dr. Josué Chou aware. New orders received. 1923: Bedside shift change report given to Gabriel Guzman RN (oncoming nurse) by Elie Gorman RN (offgoing nurse). Report included the following information SBAR, Kardex, Intake/Output and MAR.

## 2017-02-17 NOTE — PROGRESS NOTES
PICC line site and dressing clean, dry and intact. No complications noted. John Solano RN. BSN. KATARINA CRISOSTOMO, PICC Nurse.  Vascular Access Team

## 2017-02-18 NOTE — PROGRESS NOTES
Hospitalist Progress Note    NAME: Gonzalo Bush   :  1935   MRN:  [id number]       Interim Hospital Summary: 80 y.o. female whom presented on 2/15/2017 with      Assessment & Plan:  Respiratory arrest followed by PEA cardiac arrest POA  OMA on CPAP  Severe lactic acidosis 8.2, due to hypoperfusion during arrest  Elevated LFTs, suspect early shock liver from cardiac arrest  Hyperglycemia POA, likely due to steroid and stress  Leukocytosis POA likely stress and steroids related   Suspected anoxic brain injury due to cardiac arrest  P. afib -- on eliquis  Chronic diastolic chf EF 59-77%   HTN  Possible seizures related to anoxic brain injury  Family now decided comfort care and withdrawal of care as prognosis remain grim  Pt initially treated with IVF, empiric abx, intubation  Awaiting hospice    Code: Partial  DVT prophylaxis: apixaban  Surrogate decision maker: Children      Subjective: Pt seen and examined at bedside. Intubated, unresponsive. Overnight events d/w RN   CHIEF COMPLAINT: f/u \"Respiratory arrest\"       Review of Systems:  Symptom Y/N Comments  Symptom Y/N Comments   Fever/Chills    Chest Pain     Poor Appetite    Edema     Cough    Abdominal Pain     Sputum    Joint Pain     SOB/ROMERO    Pruritis/Rash     Nausea/vomit    Tolerating PT/OT     Diarrhea    Tolerating Diet     Constipation    Other       Could NOT obtain due to: intubated     Objective:     VITALS:   Last 24hrs VS reviewed since prior progress note.  Most recent are:  Patient Vitals for the past 24 hrs:   Temp Pulse Resp BP SpO2   17 1400 - (!) 117 28 (!) 59/36 (!) 86 %   17 1300 - (!) 119 27 (!) 78/39 (!) 83 %   17 1200 99.3 °F (37.4 °C) (!) 116 28 (!) 65/33 (!) 84 %   17 1100 - (!) 112 27 100/54 (!) 87 %   17 1000 - (!) 115 (!) 31 114/60 (!) 87 %   17 0900 - (!) 119 (!) 34 138/70 (!) 89 %   17 0800 97.9 °F (36.6 °C) (!) 122 29 141/68 91 %   17 0700 98.2 °F (36.8 °C) (!) 108 27 161/73 100 %   02/18/17 0300 96.8 °F (36 °C) (!) 106 25 184/79 100 %   02/18/17 0200 - 97 29 162/90 100 %   02/18/17 0100 - 96 28 (!) 161/92 100 %   02/18/17 0001 96.8 °F (36 °C) 96 27 (!) 170/91 100 %   02/17/17 2300 - 93 27 171/86 100 %   02/17/17 2200 - 86 27 (!) 189/99 100 %   02/17/17 2120 - 63 (!) 7 - 100 %   02/17/17 2100 - (!) 56 16 169/86 100 %   02/17/17 2002 - (!) 55 16 - 100 %   02/17/17 2000 96.7 °F (35.9 °C) (!) 56 16 167/81 100 %   02/17/17 1800 - (!) 54 16 162/79 100 %   02/17/17 1700 - (!) 54 16 154/74 100 %       Intake/Output Summary (Last 24 hours) at 02/18/17 1617  Last data filed at 02/18/17 1148   Gross per 24 hour   Intake           713.01 ml   Output              715 ml   Net            -1.99 ml        PHYSICAL EXAM:  General: WD, WN. Intubated, unespnsive    EENT:  EOMI. Anicteric sclerae. MMM  Resp:  CTA bilaterally, no wheezing or rales. No accessory muscle use  CV:  Regular  rhythm,  No edema  GI:  Soft, Non distended, Non tender.  +Bowel sounds  Neurologic:  Exam limited due to intubation status. Unreponsive   Psych:   Exam limited as pt unresponsive  Skin:  No rashes. No jaundice    Reviewed most current lab test results and cultures  YES  Reviewed most current radiology test results   YES  Review and summation of old records today    NO  Reviewed patient's current orders and MAR    YES  PMH/SH reviewed - no change compared to H&P  ________________________________________________________________________  Care Plan discussed with:    Comments   Patient     Family  y Daughter at bedside   RN y    Care Manager     Consultant                        Multidiciplinary team rounds were held today with , nursing, pharmacist and clinical coordinator. Patient's plan of care was discussed; medications were reviewed and discharge planning was addressed.      ________________________________________________________________________  Total NON critical care TIME:  15 Minutes    Total CRITICAL CARE TIME Spent:  Minutes non procedure based      Comments   >50% of visit spent in counseling and coordination of care     ________________________________________________________________________  Clare Mayberry MD     Procedures: see electronic medical records for all procedures/Xrays and details which were not copied into this note but were reviewed prior to creation of Plan. LABS:  I reviewed today's most current labs and imaging studies.   Pertinent labs include:  Recent Labs      02/18/17 0359 02/17/17 0321 02/16/17   0342   WBC  16.8*  13.5*  15.2*   HGB  10.5*  9.2*  9.3*   HCT  32.9*  28.4*  28.4*   PLT  208  189  211     Recent Labs      02/18/17 0359 02/17/17 0321 02/16/17   0342   NA  146*  144  145   K  3.3*  2.7*  3.1*   CL  110*  109*  107   CO2  25  26  29   GLU  151*  129*  116*   BUN  19  25*  26*   CREA  0.63  0.88  0.88   CA  7.8*  7.7*  7.8*   MG  2.1  1.9  2.0   PHOS  4.2  2.4*  2.2*   ALB  2.4*  2.3*  2.5*   TBILI  0.6  0.7  0.6   SGOT  134*  80*  69*   ALT  44  50  73   INR   --    --   1.2*       Signed: Clare Mayberry MD

## 2017-02-18 NOTE — PROGRESS NOTES
[address] [facility] representative Mr Min Ramey informed about the compassionated withdraw of care, updated about the case, asked to call back when announce cardiac arrest,  2030, family at bedside, wait till be ready to start the process.   2115, family ready, Propofol stopped, RT at bedside, ativan 1 mg PRN given, patient extubated, no RR at beginning, Oxygen via NRM applied,

## 2017-02-18 NOTE — PROGRESS NOTES
Responded to Death  in critical care  Consulted with Nurse Kyra Hay and the patients family expressed thanfulness for my presence but declined prayer nor did they  express any other need at this time. Family was Advised of Angel. Chaplains will follow as able and/or needed. [address] [facility]  PRN    paging service [phone])

## 2017-02-18 NOTE — HOSPICE
[facility]  [phone]    Patient Name: Hector Doran  YOB: 1935  Age: 80 y.o. 190 [facility] RN Note:  Hospice consult noted, Chart reviewed, Plan of care reviewed with patients nurse & Care manager. Call to Claudetta Saint,  message left requesting return call to schedule hospice information meeting. Pt is appropriate for inpatient hospice at this time. Her respiratory rate is 32 with agonal shallow breathing, she has excess secretions and is hot to the touch. She is unresponsive at this time. Her blood pressure is 105/44, , oxygen level 87. Thank you for the opportunity to be of service to this patient.

## 2017-02-18 NOTE — HOSPICE
[facility]  [phone]    Patient Name: Analia Trevizo  YOB: 1935  Age: 80 y.o. 190 [facility] RN Note:    In to meet with Christie Young, pt's son and family. Discussed Hospice philosophy, general plan of care, levels of care, services and on call procedures. Pt's blood pressure is critically low. At time of second assessment pt's blood pressure was 59/36, respiratory rate of 28, heart rate of 117. Pt is actively passing at this time. Family is upset about the events that have lead up to this admission to the hospital. They feel like she wasn't treated for her complaint in the ER, was just looked at as someone who \"bounces back and forth\" from the ER. Discussed the limited amount of time that they have left with their mother, discussed with them that with a BP that low she is not a candidate to move at this time, discussed with them paper work and the process of admission to hospice. Family and I have chosen to defer admission at this time due to the pt actively passing. Family needs to spend their remaining time with her. She is comfortable and is receiving appropriate comfort care. Thank you for the opportunity to be of service to this patient.

## 2017-02-18 NOTE — ROUTINE PROCESS
Bedside shift change report given to Harpreet Veliz (oncoming nurse) by Thais Hogue (offgoing nurse). Report included the following information SBAR, Kardex and MAR. 0830- Patient assessed. Medicated for secretions, shortness of breath, and anxiety. 0745- Pulmonlogist at the bedside. Morphine orders received and patient placed on comfort care measures. Pink DNR sheet signed in chart. 0805- 8 mg of morphine given. Patient repositioned and mouth care preformed. 9777- Patient still experiencing respiratory distress. Spoke with Dr. Shanel Santana who will increase frequency of morphine. Hospice paged. Spoke with Lyndsey Graham who is aware of consult. 8837- 8 mg of morphine given. Patient turned. Preformed mouth care. 1028- Medicated with 8 mg of morphine and 1 mg of lorazepam for labored respirations respiratory rate above 30.  1140- Medicated with 8 mg of morphine for labored respirations. Patient repositioned. Preformed mouth care. 1215- Robinul given for terminal secretions. 1305-  Morphine given for labored respirations. Mouth care completed. 1400- Repositioned patient. 1451-  Morphine given for labored respirations. 1737- Patient without respirations, pulse, or blood pressure. Reads asystole on the monitor. Provided emotional support to the family. Aly paged. [address] at bedside. Dr. Lulú Alvarez paged to pronounce patient. Family remains at bedside. Nursing assured them that they could remain at the bedside as long as they wanted.  home information  [address], [address] [place] [address] [phone]- Family still at bedside. MD has not pronounced patient yet. - Report to tigist who will assure that the patient's post mortem care is complete and MD will pronounce pt.

## 2017-02-18 NOTE — HOSPICE
[facility]  [phone]     Patient Name: Agustin Carreon  YOB: 1935  Age: 80 y.o. 190 [facility] RN Note:  Hospice consult received, reviewing chart. Will follow up with Unit Nurse and Care Manager to discuss plan of care, patient status and discharge disposition within the hour. Thank you for the opportunity to be of service to this patient.

## 2017-02-18 NOTE — PROGRESS NOTES
02:30 RR increasing to 35. Fentanyl given for generalized comfort. 02:50 RR remain fast and shallow. HR increased 104 after turning. RR remain 35. Ativan given for generalized comfort. 05:00 Family remains at bedside. No changes overnight,  At times hypertensive (SBP > 170). Medicate occasionally with Ativan for generalized comfort and RR >30. No signs of twitches, jerking or seizure activity.

## 2017-02-18 NOTE — PROGRESS NOTES
Patient transitioned to comfort measures last night. Extubated. Unresponsive on exam.  Some labored breathing. Family at bedside. Will ensure all comfort measures are in place. Transfer to oncology to maximize family visitation/privacy.    Sandi Anne MD

## 2017-02-19 NOTE — DISCHARGE SUMMARY
Hospitalist Discharge Summary     Patient ID:  Gonzalo Bush  [id number]  80 y.o.  1935    PCP on record: Ely Powell MD    Admit date: 2/15/2017  Discharge date and time: 2/19/2017      DISCHARGE DIAGNOSIS:  Respiratory arrest followed by PEA cardiac arrest   OMA on CPAP  Severe lactic acidosis   Elevated LFTs  Hyperglycemia  Leukocytosis   Suspected anoxic brain injury   P. afib   Chronic diastolic chf EF 05-50% 3/76  HTN  Possible seizures related to anoxic brain injury    CONSULTATIONS:  IP CONSULT TO NEUROLOGY    Excerpted HPI from H&P of Pricilla Moran MD:  Gonzalo Bush is a 80 y.o.  female seen in ER last night -- presented with SOB, cough with some sputum and chest pain. Her bipap machine had broken and she was afraid to sleep without one. CXR was normal. Ambulating O2 sat was %. Patient discharged home around 6am with oral prednisone pulse. Son came to pick her up. Per ER physician, on drive home, son stopped hearing O2 concentrator going off and looked over to find patient had stopped breathing and turned car back to ER which took about 10 minutes reportedly. On arrival, patient was in PEA and given epi, atropine and pulse found. She was intubated.     History above obtained from chart review and discussion with Dr. Bautista Ware. Family not at bedside.     We were asked to admit for work up and evaluation of the above problems. ______________________________________________________________________  DISCHARGE SUMMARY/HOSPITAL COURSE:  for full details see H&P, daily progress notes, labs, consult notes.      Respiratory arrest followed by PEA cardiac arrest POA  OMA on CPAP  Severe lactic acidosis 8.2, due to hypoperfusion during arrest  Elevated LFTs, suspect early shock liver from cardiac arrest  Hyperglycemia POA, likely due to steroid and stress  Leukocytosis POA likely stress and steroids related   Suspected anoxic brain injury due to cardiac arrest  P. afib -- on eliquis  Chronic diastolic chf EF 89-56% 3/65  HTN  Possible seizures related to anoxic brain injury  Pt continued to deteriorate, family decided comfort care and withdrawal of care as prognosis remain grim  Pt initially treated with IVF, empiric abx, intubation  Hospice decline admission as pt was actively declining.  Pt       _______________________________________________________________________    Condition at Discharge:  Pt   _______________________________________________________________________        Total time in minutes spent coordinating this discharge (includes going over instructions, follow-up, prescriptions, and preparing report for sign off to her PCP) : 35 minutes    Signed:  Bella Nelson MD

## 2017-02-20 ENCOUNTER — HOSPICE ADMISSION (OUTPATIENT)
Dept: HOSPICE | Facility: HOSPICE | Age: 82
End: 2017-02-20

## 2017-02-20 LAB
BACTERIA SPEC CULT: NORMAL
SERVICE CMNT-IMP: NORMAL

## 2017-02-20 NOTE — PROCEDURES
[address]   EEG       Name:  Denise Courtney   MR#:  [id number]   :  1935   Account #:  [de-identified]    Date of Procedure:  2017   Date of Adm:  02/15/2017       EXAM NUMBER: [id number]. DESCRIPTION OF THE PROCEDURE: Electrodes were applied in accordance with the International 10/20 System of electrode placement. EEG was reviewed in both referential and bipolar montages. In addition to EEG data EKG data was recorded. DESCRITPION OF FINDINGS: Background consists of 6 Hz activity. This activity appeared disorganized at times. No overt seizures or interictal hallmarks were noted during the study. Muscle artifact was seen close to the end of the study. IMPRESSION: This electroencephalogram is abnormal for generalized theta slowing. Clinical correlation advised.         MD TRAN Vu / MIKE   D:  2017   17:13   T:  2017   08:28   Job #:  [id number]

## 2023-02-27 NOTE — PROGRESS NOTES
Death Declaration        Pt Name  Marty Alvarez   Admit date:  2/15/2017   Date and time of death:  02/18/17 @ 320 2035    Room Number  2552/01    Medical Record Number  [id number] @ [facility]   Age  80 y.o. Date of Birth 1935   PCP Ely Powell, MD   Attending physician Chloe Escobedo MD      Code Status  DNR    Patient seen and examined     Mental status   Unresponsive    Pupils Dilated and Fixed    Respiration Nil    Pulse  Absent     Heart Sounds  Absent    Rhythm     Family  Notified by Nursing staff    Chaplan Service  Notified by Nursing staff     Death certificate and discharge summary completion remain  Dr. Chloe Escobedo MD's responsibility.    Martha Lino MD MPH  FACP                               2/18/2017 When Outside In The Sun, Do You...: mostly burns, rarely tans

## 2023-10-07 NOTE — PROGRESS NOTES
Hospitalist Progress Note    NAME: Ari Miller   :  1935   MRN:  [id number]       Interim Hospital Summary: 80 y.o. female whom presented on 2/15/2017 with      Assessment & Plan:  Respiratory arrest followed by PEA cardiac arrest POA  OMA on CPAP  Severe lactic acidosis 8.2, due to hypoperfusion during arrest  Elevated LFTs, suspect early shock liver from cardiac arrest  Hyperglycemia POA, likely due to steroid and stress  Leukocytosis POA likely stress and steroids related   Suspected anoxic brain injury due to cardiac arrest  P. afib -- on eliquis  Chronic diastolic chf EF 70-10%   HTN  Possible seizures related to anoxic brain injury  Pt managed in ICU, intubated  IVF, on empiric abx  Prognosis seems grim at this time  Started anti seizure meds by neurology  Awaiting EEG  Pulmonary involved in patient's care  I had a long discussion with daughter about her medical condition and prognosis, family understand. Family now decided do not resuscitate is cardiac arrest happens again  May decided comfort care in 24-48 if no improvement    Code: Partial  DVT prophylaxis: apixaban  Surrogate decision maker: Children      Subjective: Pt seen and examined at bedside. Intubated, unresponsive. Overnight events d/w RN   CHIEF COMPLAINT: f/u \"Respiratory arrest\"       Review of Systems:  Symptom Y/N Comments  Symptom Y/N Comments   Fever/Chills    Chest Pain     Poor Appetite    Edema     Cough    Abdominal Pain     Sputum    Joint Pain     SOB/ROMERO    Pruritis/Rash     Nausea/vomit    Tolerating PT/OT     Diarrhea    Tolerating Diet     Constipation    Other       Could NOT obtain due to: intubated     Objective:     VITALS:   Last 24hrs VS reviewed since prior progress note.  Most recent are:  Patient Vitals for the past 24 hrs:   Temp Pulse Resp BP SpO2   17 1100 98 °F (36.7 °C) 64 16 161/75 100 %   17 1000 - 61 16 144/64 100 %   17 0900 - 66 16 151/86 100 %   17 0800 - 77 16 164/76 100 %   02/17/17 0744 - 77 16 - 100 %   02/17/17 0700 97.9 °F (36.6 °C) 77 16 148/70 100 %   02/17/17 0429 - 76 16 - 100 %   02/17/17 0400 - 74 16 148/67 100 %   02/17/17 0300 - 74 16 144/65 100 %   02/17/17 0225 - - - - 100 %   02/17/17 0200 - 75 16 148/66 100 %   02/17/17 0100 - 75 16 150/65 100 %   02/17/17 0032 - 75 16 - 100 %   02/17/17 0000 97.5 °F (36.4 °C) - - - -   02/16/17 2300 - 74 16 153/61 100 %   02/16/17 2200 - 73 16 151/62 100 %   02/16/17 2100 - 67 16 139/62 100 %   02/16/17 2043 - 67 16 - 100 %   02/16/17 2042 - - - - 100 %   02/16/17 2000 100.4 °F (38 °C) 66 16 158/59 100 %   02/16/17 1900 - 80 16 168/71 100 %   02/16/17 1800 - 72 16 157/61 100 %   02/16/17 1700 - 75 16 166/66 100 %   02/16/17 1600 100.3 °F (37.9 °C) 71 16 163/69 100 %   02/16/17 1543 - 72 16 - 100 %   02/16/17 1500 - 75 16 162/74 100 %   02/16/17 1320 - - - - 100 %   02/16/17 1300 - 74 17 153/63 100 %   02/16/17 1200 (!) 100.7 °F (38.2 °C) 78 19 153/69 100 %       Intake/Output Summary (Last 24 hours) at 02/17/17 1129  Last data filed at 02/17/17 1100   Gross per 24 hour   Intake           1853.7 ml   Output             1205 ml   Net            648.7 ml        PHYSICAL EXAM:  General: WD, WN. Intubated, unespnsive    EENT:  EOMI. Anicteric sclerae. MMM  Resp:  CTA bilaterally, no wheezing or rales. No accessory muscle use  CV:  Regular  rhythm,  No edema  GI:  Soft, Non distended, Non tender.  +Bowel sounds  Neurologic:  Exam limited due to intubation status. Unreponsive   Psych:   Exam limited as pt unresponsive  Skin:  No rashes.   No jaundice    Reviewed most current lab test results and cultures  YES  Reviewed most current radiology test results   YES  Review and summation of old records today    NO  Reviewed patient's current orders and MAR    YES  PMH/ reviewed - no change compared to H&P  ________________________________________________________________________  Care Plan discussed with:    Comments   Patient     Family y Daughter at bedside   RN y    Care Manager     Consultant  y Pulmonary                     Multidiciplinary team rounds were held today with , nursing, pharmacist and clinical coordinator. Patient's plan of care was discussed; medications were reviewed and discharge planning was addressed. ________________________________________________________________________  Total NON critical care TIME:  35 Minutes    Total CRITICAL CARE TIME Spent:  Minutes non procedure based      Comments   >50% of visit spent in counseling and coordination of care x Discussion with family as above   ________________________________________________________________________  Irving Burr MD     Procedures: see electronic medical records for all procedures/Xrays and details which were not copied into this note but were reviewed prior to creation of Plan. LABS:  I reviewed today's most current labs and imaging studies.   Pertinent labs include:  Recent Labs      02/17/17   0321  02/16/17   0342  02/15/17   0713   WBC  13.5*  15.2*  15.2*   HGB  9.2*  9.3*  11.1*   HCT  28.4*  28.4*  36.0   PLT  189  211  257     Recent Labs      02/17/17   0321  02/16/17   0342  02/15/17   0713  02/14/17   2102   NA  144  145  143  141   K  2.7*  3.1*  4.4  3.7   CL  109*  107  104  99   CO2  26  29  22  33*   GLU  129*  116*  281*  162*   BUN  25*  26*  20  20   CREA  0.88  0.88  1.12*  0.90   CA  7.7*  7.8*  7.9*  8.9   MG  1.9  2.0   --   1.7   PHOS  2.4*  2.2*   --    --    ALB  2.3*  2.5*  2.9*  3.5   TBILI  0.7  0.6  0.3  0.2   SGOT  80*  69*  144*  23   ALT  50  73  100*  27   INR   --   1.2*   --    --        Signed: Irving Burr MD none

## 2023-10-24 NOTE — PROGRESS NOTES
The family of pt desire to withdraw and extubate the pt at 830pm.  Will place order set for comfort measures. Double Island Pedicle Flap Text: The defect edges were debeveled with a #15 scalpel blade.  Given the location of the defect, shape of the defect and the proximity to free margins a double island pedicle advancement flap was deemed most appropriate.  Using a sterile surgical marker, an appropriate advancement flap was drawn incorporating the defect, outlining the appropriate donor tissue and placing the expected incisions within the relaxed skin tension lines where possible.    The area thus outlined was incised deep to adipose tissue with a #15 scalpel blade.  The skin margins were undermined to an appropriate distance in all directions around the primary defect and laterally outward around the island pedicle utilizing iris scissors.  There was minimal undermining beneath the pedicle flap.